# Patient Record
Sex: FEMALE | Race: WHITE | NOT HISPANIC OR LATINO | Employment: FULL TIME | URBAN - METROPOLITAN AREA
[De-identification: names, ages, dates, MRNs, and addresses within clinical notes are randomized per-mention and may not be internally consistent; named-entity substitution may affect disease eponyms.]

---

## 2017-10-24 ENCOUNTER — GENERIC CONVERSION - ENCOUNTER (OUTPATIENT)
Dept: OTHER | Facility: OTHER | Age: 52
End: 2017-10-24

## 2017-10-24 DIAGNOSIS — Z12.31 ENCOUNTER FOR SCREENING MAMMOGRAM FOR MALIGNANT NEOPLASM OF BREAST: ICD-10-CM

## 2018-01-22 VITALS
SYSTOLIC BLOOD PRESSURE: 116 MMHG | OXYGEN SATURATION: 96 % | HEART RATE: 85 BPM | WEIGHT: 137 LBS | DIASTOLIC BLOOD PRESSURE: 70 MMHG | BODY MASS INDEX: 22.82 KG/M2 | HEIGHT: 65 IN | TEMPERATURE: 98.9 F

## 2018-01-22 VITALS — HEART RATE: 85 BPM | OXYGEN SATURATION: 96 %

## 2018-07-09 ENCOUNTER — OFFICE VISIT (OUTPATIENT)
Dept: FAMILY MEDICINE CLINIC | Facility: CLINIC | Age: 53
End: 2018-07-09
Payer: COMMERCIAL

## 2018-07-09 VITALS
DIASTOLIC BLOOD PRESSURE: 90 MMHG | HEART RATE: 81 BPM | OXYGEN SATURATION: 98 % | BODY MASS INDEX: 23.32 KG/M2 | TEMPERATURE: 97.8 F | WEIGHT: 140 LBS | HEIGHT: 65 IN | SYSTOLIC BLOOD PRESSURE: 124 MMHG

## 2018-07-09 DIAGNOSIS — E01.0 THYROMEGALY: Primary | ICD-10-CM

## 2018-07-09 DIAGNOSIS — Z13.220 SCREENING, LIPID: ICD-10-CM

## 2018-07-09 DIAGNOSIS — Z11.59 ENCOUNTER FOR HEPATITIS C SCREENING TEST FOR LOW RISK PATIENT: ICD-10-CM

## 2018-07-09 DIAGNOSIS — R63.5 WEIGHT GAIN: ICD-10-CM

## 2018-07-09 DIAGNOSIS — R22.1 NECK FULLNESS: ICD-10-CM

## 2018-07-09 PROCEDURE — 3008F BODY MASS INDEX DOCD: CPT | Performed by: FAMILY MEDICINE

## 2018-07-09 PROCEDURE — 99213 OFFICE O/P EST LOW 20 MIN: CPT | Performed by: FAMILY MEDICINE

## 2018-07-09 RX ORDER — PROMETHAZINE HYDROCHLORIDE AND CODEINE PHOSPHATE 6.25; 1 MG/5ML; MG/5ML
5 SYRUP ORAL EVERY 4 HOURS PRN
COMMUNITY
Start: 2017-10-24 | End: 2019-05-08 | Stop reason: CLARIF

## 2018-07-09 RX ORDER — BENZONATATE 200 MG/1
1 CAPSULE ORAL 3 TIMES DAILY PRN
COMMUNITY
Start: 2017-10-24 | End: 2019-05-08 | Stop reason: CLARIF

## 2018-07-09 NOTE — PROGRESS NOTES
Assessment/Plan:    No problem-specific Assessment & Plan notes found for this encounter  Diagnoses and all orders for this visit:    I am going to go ahead by starting with an ultrasound of the thyroid as well as some basic lab work  If everything is normal in her symptoms persist I may refer her to ENT for further evaluation of the swallowing/neck fullness problem  Thyromegaly  Neck fullness  -     US thyroid; Future  -     TSH, 3rd generation with Free T4 reflex; Future    Weight gain  -     CBC and differential; Future  -     Comprehensive metabolic panel; Future  -     TSH, 3rd generation with Free T4 reflex; Future    Screening, lipid  -     Lipid Panel with Direct LDL reflex; Future    Encounter for hepatitis C screening test for low risk patient  -     Hepatitis C antibody; Future                Subjective:   Chief Complaint   Patient presents with    thyroid     pt states over the pass few months she has noticed wt gain, ear pain, pt also states her her throat feel swollen but no pain, pt would like to rule out thyroid problems         Patient ID: Calista Severs is a 47 y o  female      The pt is here because she has had some weird sx  Feels like there is a vice around her neck that causes some discomfort with swallowing  Has had some weight gain  Feels like maybe her thyroid is swollen  Everyone in the family has thyroid issues - mom on synthroid for years, mat aunt had thyroidectomy, mat GM had a slow growing thyroid cancer  No more fatigue than usual  Has had some ear discomfort, sometimes the right sometimes the left  No cp/sob  No d/c  No dysuria/freq          The following portions of the patient's history were reviewed and updated as appropriate: allergies, current medications, past family history, past medical history, past social history, past surgical history and problem list     Review of Systems      Objective:  Vitals:    07/09/18 1432   BP: 124/90   Pulse: 81   Temp: 97 8 °F (36 6 °C) SpO2: 98%   Weight: 63 5 kg (140 lb)   Height: 5' 5" (1 651 m)      Physical Exam   Constitutional: She is oriented to person, place, and time  She appears well-developed and well-nourished  HENT:   Head: Normocephalic and atraumatic  Right Ear: Tympanic membrane, external ear and ear canal normal    Left Ear: Tympanic membrane, external ear and ear canal normal    Nose: Nose normal    Mouth/Throat: Oropharynx is clear and moist    Neck: Normal range of motion  Neck supple  Thyromegaly present  Pulmonary/Chest: Effort normal  No respiratory distress  Musculoskeletal: Normal range of motion  Lymphadenopathy:     She has no cervical adenopathy  Neurological: She is alert and oriented to person, place, and time  Skin: Skin is warm and dry  Psychiatric: She has a normal mood and affect

## 2018-07-09 NOTE — PATIENT INSTRUCTIONS
Hypothyroidism   WHAT YOU NEED TO KNOW:   Hypothyroidism is a condition that develops when the thyroid gland does not make enough thyroid hormone  Thyroid hormones help control body temperature, heart rate, growth, and weight  DISCHARGE INSTRUCTIONS:   Call 911 for any of the following:   · You have sudden chest pain or shortness of breath  · You have a seizure  · You feel like you are going to faint  Return to the emergency department if:   · You have diarrhea, tremors, or trouble sleeping  · Your legs, ankles, or feet are swollen  Contact your healthcare provider if:   · You have a fever  · You have chills, a cough, or feel weak and achy  · You have pain and swelling in your muscles and joints  · Your skin is itchy, swollen, or you have a rash  · Your signs and symptoms return or get worse, even after treatment  · You have questions or concerns about your condition or care  Medicines:   · Thyroid hormone replacement medicine  helps bring your thyroid hormone level back to normal     · Take your medicine as directed  Contact your healthcare provider if you think your medicine is not helping or if you have side effects  Tell him or her if you are allergic to any medicine  Keep a list of the medicines, vitamins, and herbs you take  Include the amounts, and when and why you take them  Bring the list or the pill bottles to follow-up visits  Carry your medicine list with you in case of an emergency  Follow up with your healthcare provider as directed: You may need to return for more blood tests to check your thyroid hormone level  This will show if you are getting the right amount of thyroid medicine  Write down your questions so you remember to ask them during your visits  © 2017 [employer] Information is for End User's use only and may not be sold, redistributed or otherwise used for commercial purposes   All illustrations and images included in CareNotes® are the copyrighted property of [employer]  or [employer]  The above information is an  only  It is not intended as medical advice for individual conditions or treatments  Talk to your doctor, nurse or pharmacist before following any medical regimen to see if it is safe and effective for you

## 2018-07-14 LAB
ALBUMIN SERPL-MCNC: 4.6 G/DL (ref 3.5–5.5)
ALBUMIN/GLOB SERPL: 1.8 {RATIO} (ref 1.2–2.2)
ALP SERPL-CCNC: 58 IU/L (ref 39–117)
ALT SERPL-CCNC: 10 IU/L (ref 0–32)
AST SERPL-CCNC: 16 IU/L (ref 0–40)
BASOPHILS # BLD AUTO: 0 X10E3/UL (ref 0–0.2)
BASOPHILS NFR BLD AUTO: 1 %
BILIRUB SERPL-MCNC: 0.3 MG/DL (ref 0–1.2)
BUN SERPL-MCNC: 9 MG/DL (ref 6–24)
BUN/CREAT SERPL: 11 (ref 9–23)
CALCIUM SERPL-MCNC: 9.4 MG/DL (ref 8.7–10.2)
CHLORIDE SERPL-SCNC: 98 MMOL/L (ref 96–106)
CHOLEST SERPL-MCNC: 160 MG/DL (ref 100–199)
CO2 SERPL-SCNC: 22 MMOL/L (ref 20–29)
CREAT SERPL-MCNC: 0.81 MG/DL (ref 0.57–1)
EOSINOPHIL # BLD AUTO: 0.3 X10E3/UL (ref 0–0.4)
EOSINOPHIL NFR BLD AUTO: 4 %
ERYTHROCYTE [DISTWIDTH] IN BLOOD BY AUTOMATED COUNT: 13.2 % (ref 12.3–15.4)
GLOBULIN SER-MCNC: 2.6 G/DL (ref 1.5–4.5)
GLUCOSE SERPL-MCNC: 92 MG/DL (ref 65–99)
HCT VFR BLD AUTO: 39.3 % (ref 34–46.6)
HCV AB S/CO SERPL IA: 0.1 S/CO RATIO (ref 0–0.9)
HDLC SERPL-MCNC: 57 MG/DL
HGB BLD-MCNC: 13 G/DL (ref 11.1–15.9)
IMM GRANULOCYTES # BLD: 0 X10E3/UL (ref 0–0.1)
IMM GRANULOCYTES NFR BLD: 0 %
LDLC SERPL CALC-MCNC: 94 MG/DL (ref 0–99)
LDLC/HDLC SERPL: 1.6 RATIO (ref 0–3.2)
LYMPHOCYTES # BLD AUTO: 1.4 X10E3/UL (ref 0.7–3.1)
LYMPHOCYTES NFR BLD AUTO: 23 %
MCH RBC QN AUTO: 30.7 PG (ref 26.6–33)
MCHC RBC AUTO-ENTMCNC: 33.1 G/DL (ref 31.5–35.7)
MCV RBC AUTO: 93 FL (ref 79–97)
MONOCYTES # BLD AUTO: 0.6 X10E3/UL (ref 0.1–0.9)
MONOCYTES NFR BLD AUTO: 10 %
NEUTROPHILS # BLD AUTO: 3.6 X10E3/UL (ref 1.4–7)
NEUTROPHILS NFR BLD AUTO: 62 %
PLATELET # BLD AUTO: 272 X10E3/UL (ref 150–379)
POTASSIUM SERPL-SCNC: 3.9 MMOL/L (ref 3.5–5.2)
PROT SERPL-MCNC: 7.2 G/DL (ref 6–8.5)
RBC # BLD AUTO: 4.24 X10E6/UL (ref 3.77–5.28)
SL AMB EGFR AFRICAN AMERICAN: 97 ML/MIN/1.73
SL AMB EGFR NON AFRICAN AMERICAN: 84 ML/MIN/1.73
SL AMB VLDL CHOLESTEROL CALC: 9 MG/DL (ref 5–40)
SODIUM SERPL-SCNC: 138 MMOL/L (ref 134–144)
TRIGL SERPL-MCNC: 47 MG/DL (ref 0–149)
TSH SERPL DL<=0.005 MIU/L-ACNC: 3.08 UIU/ML (ref 0.45–4.5)
WBC # BLD AUTO: 5.9 X10E3/UL (ref 3.4–10.8)

## 2018-11-10 ENCOUNTER — HOSPITAL ENCOUNTER (OUTPATIENT)
Dept: ULTRASOUND IMAGING | Facility: HOSPITAL | Age: 53
Discharge: HOME | End: 2018-11-10
Attending: FAMILY MEDICINE
Payer: COMMERCIAL

## 2018-11-10 DIAGNOSIS — E01.0 THYROMEGALY: ICD-10-CM

## 2018-11-10 PROCEDURE — 76536 US EXAM OF HEAD AND NECK: CPT

## 2019-04-12 ENCOUNTER — TELEPHONE (OUTPATIENT)
Dept: FAMILY MEDICINE CLINIC | Facility: CLINIC | Age: 54
End: 2019-04-12

## 2019-05-08 ENCOUNTER — OFFICE VISIT (OUTPATIENT)
Dept: FAMILY MEDICINE CLINIC | Facility: CLINIC | Age: 54
End: 2019-05-08
Payer: COMMERCIAL

## 2019-05-08 VITALS
DIASTOLIC BLOOD PRESSURE: 84 MMHG | BODY MASS INDEX: 22.82 KG/M2 | HEART RATE: 77 BPM | TEMPERATURE: 98.3 F | SYSTOLIC BLOOD PRESSURE: 142 MMHG | OXYGEN SATURATION: 99 % | HEIGHT: 65 IN | WEIGHT: 137 LBS

## 2019-05-08 DIAGNOSIS — Z12.11 SCREEN FOR COLON CANCER: ICD-10-CM

## 2019-05-08 DIAGNOSIS — Z00.00 ROUTINE ADULT HEALTH MAINTENANCE: Primary | ICD-10-CM

## 2019-05-08 DIAGNOSIS — Z12.39 SCREENING FOR BREAST CANCER: ICD-10-CM

## 2019-05-08 DIAGNOSIS — N93.9 ABNORMAL UTERINE BLEEDING: ICD-10-CM

## 2019-05-08 PROCEDURE — 99396 PREV VISIT EST AGE 40-64: CPT | Performed by: FAMILY MEDICINE

## 2019-05-09 ENCOUNTER — CLINICAL SUPPORT (OUTPATIENT)
Dept: FAMILY MEDICINE CLINIC | Facility: CLINIC | Age: 54
End: 2019-05-09
Payer: COMMERCIAL

## 2019-05-09 DIAGNOSIS — N93.9 ABNORMAL UTERINE BLEEDING: Primary | ICD-10-CM

## 2019-05-09 PROCEDURE — 36415 COLL VENOUS BLD VENIPUNCTURE: CPT

## 2019-05-10 LAB
BASOPHILS # BLD AUTO: 0 X10E3/UL (ref 0–0.2)
BASOPHILS NFR BLD AUTO: 1 %
EOSINOPHIL # BLD AUTO: 0.2 X10E3/UL (ref 0–0.4)
EOSINOPHIL NFR BLD AUTO: 4 %
ERYTHROCYTE [DISTWIDTH] IN BLOOD BY AUTOMATED COUNT: 13.4 % (ref 12.3–15.4)
FSH SERPL-ACNC: 14 MIU/ML
HCT VFR BLD AUTO: 39.5 % (ref 34–46.6)
HGB BLD-MCNC: 13.4 G/DL (ref 11.1–15.9)
IMM GRANULOCYTES # BLD: 0 X10E3/UL (ref 0–0.1)
IMM GRANULOCYTES NFR BLD: 0 %
LH SERPL-ACNC: 8.1 MIU/ML
LYMPHOCYTES # BLD AUTO: 1.3 X10E3/UL (ref 0.7–3.1)
LYMPHOCYTES NFR BLD AUTO: 28 %
MCH RBC QN AUTO: 30.5 PG (ref 26.6–33)
MCHC RBC AUTO-ENTMCNC: 33.9 G/DL (ref 31.5–35.7)
MCV RBC AUTO: 90 FL (ref 79–97)
MONOCYTES # BLD AUTO: 0.4 X10E3/UL (ref 0.1–0.9)
MONOCYTES NFR BLD AUTO: 8 %
NEUTROPHILS # BLD AUTO: 2.8 X10E3/UL (ref 1.4–7)
NEUTROPHILS NFR BLD AUTO: 59 %
PLATELET # BLD AUTO: 322 X10E3/UL (ref 150–379)
RBC # BLD AUTO: 4.39 X10E6/UL (ref 3.77–5.28)
T4 FREE SERPL-MCNC: 1.15 NG/DL (ref 0.82–1.77)
TSH SERPL DL<=0.005 MIU/L-ACNC: 4.1 UIU/ML (ref 0.45–4.5)
WBC # BLD AUTO: 4.6 X10E3/UL (ref 3.4–10.8)

## 2019-06-20 ENCOUNTER — ANNUAL EXAM (OUTPATIENT)
Dept: FAMILY MEDICINE CLINIC | Facility: CLINIC | Age: 54
End: 2019-06-20
Payer: COMMERCIAL

## 2019-06-20 VITALS
BODY MASS INDEX: 22.74 KG/M2 | DIASTOLIC BLOOD PRESSURE: 78 MMHG | WEIGHT: 136.5 LBS | OXYGEN SATURATION: 90 % | SYSTOLIC BLOOD PRESSURE: 130 MMHG | HEART RATE: 86 BPM | TEMPERATURE: 98.3 F | HEIGHT: 65 IN

## 2019-06-20 DIAGNOSIS — Z12.4 SCREENING FOR CERVICAL CANCER: ICD-10-CM

## 2019-06-20 DIAGNOSIS — Z01.419 ENCOUNTER FOR GYNECOLOGICAL EXAMINATION WITHOUT ABNORMAL FINDING: Primary | ICD-10-CM

## 2019-06-20 PROCEDURE — S0612 ANNUAL GYNECOLOGICAL EXAMINA: HCPCS | Performed by: FAMILY MEDICINE

## 2019-06-24 LAB
CYTOLOGIST CVX/VAG CYTO: NORMAL
DX ICD CODE: NORMAL
HPV I/H RISK 1 DNA CVX QL PROBE+SIG AMP: NEGATIVE
OTHER STN SPEC: NORMAL
PATH REPORT.FINAL DX SPEC: NORMAL
SL AMB NOTE:: NORMAL
SL AMB SPECIMEN ADEQUACY: NORMAL

## 2019-07-08 ENCOUNTER — TELEPHONE (OUTPATIENT)
Dept: FAMILY MEDICINE CLINIC | Facility: CLINIC | Age: 54
End: 2019-07-08

## 2019-07-08 NOTE — TELEPHONE ENCOUNTER
LMOM TO CALL    Please advise patient that we have not received results of her Cologuard test yet  Has she completed this?

## 2019-08-01 ENCOUNTER — OFFICE VISIT (OUTPATIENT)
Dept: FAMILY MEDICINE CLINIC | Facility: CLINIC | Age: 54
End: 2019-08-01
Payer: COMMERCIAL

## 2019-08-01 VITALS
HEIGHT: 65 IN | DIASTOLIC BLOOD PRESSURE: 76 MMHG | BODY MASS INDEX: 22.95 KG/M2 | SYSTOLIC BLOOD PRESSURE: 128 MMHG | HEART RATE: 76 BPM | TEMPERATURE: 98.6 F | WEIGHT: 137.75 LBS | OXYGEN SATURATION: 99 %

## 2019-08-01 DIAGNOSIS — M77.8 LEFT WRIST TENDINITIS: Primary | ICD-10-CM

## 2019-08-01 PROCEDURE — 99213 OFFICE O/P EST LOW 20 MIN: CPT | Performed by: FAMILY MEDICINE

## 2019-08-01 PROCEDURE — 1036F TOBACCO NON-USER: CPT | Performed by: FAMILY MEDICINE

## 2019-08-01 PROCEDURE — 3008F BODY MASS INDEX DOCD: CPT | Performed by: FAMILY MEDICINE

## 2019-08-01 NOTE — PATIENT INSTRUCTIONS
Tendinitis   WHAT YOU NEED TO KNOW:   Tendinitis is painful inflammation or breakdown of your tendons  It may also be called tendinopathy  Tendinitis often occurs in the knee, shoulder, ankle, hip, or elbow  DISCHARGE INSTRUCTIONS:   Medicines:   · Pain medicines  such as acetaminophen or NSAIDs may decrease swelling and pain or fever  These medicines are available without a doctor's order  Ask which medicine to take  Ask how much to take and when to take it  Follow directions  Acetaminophen and NSAIDs can cause liver or kidney damage if not taken correctly  If you take blood thinner medicine, always ask your healthcare provider if NSAIDs are safe for you  Always read the medicine label and follow the directions on it before using these medicine  · Take your medicine as directed  Contact your healthcare provider if you think your medicine is not helping or if you have side effects  Tell him if you are allergic to any medicine  Keep a list of the medicines, vitamins, and herbs you take  Include the amounts, and when and why you take them  Bring the list or the pill bottles to follow-up visits  Carry your medicine list with you in case of an emergency  Management:   · Rest  your tendon as directed to help it heal  Ask your healthcare provider if you need to stop putting weight on your affected area  · Ice  helps decrease swelling and pain  Ice may also help prevent tissue damage  Use an ice pack, or put crushed ice in a plastic bag  Cover it with a towel and place it on the affected area for 10 to 15 minutes every hour or as directed  · Support devices  such as a cane, splint, shoe insert, or brace may help reduce your pain  · Physical therapy  may be ordered by your healthcare provider  This may be used to teach you exercises to help improve movement and strength, and to decrease pain  You may also learn how to improve your posture, and how to lift or exercise correctly    Prevention:   · Stretch and warm up  before you exercise  · Exercise regularly  to strengthen the muscles around your joint  Ease into an exercise routine for the first 3 weeks to prevent another injury  Ask your healthcare provider about the best exercise plan for you  Rest fully between activities  · Use the right equipment  for sports and exercise  Wear braces or tape around weak joints as directed  Follow up with your healthcare provider as directed:  Write down your questions so you remember to ask them during your visits  Contact your healthcare provider if:   · You have increased pain even after you take medicine  · You have questions or concerns about your condition or care  Return to the emergency department if:   · You have increased redness over the joint, or swelling in the joint  · You suddenly cannot move your joint  © 2017 [employer]  Information is for End User's use only and may not be sold, redistributed or otherwise used for commercial purposes  All illustrations and images included in CareNotes® are the copyrighted property of [employer]  or [employer]  The above information is an  only  It is not intended as medical advice for individual conditions or treatments  Talk to your doctor, nurse or pharmacist before following any medical regimen to see if it is safe and effective for you

## 2019-08-01 NOTE — PROGRESS NOTES
Subjective:   Chief Complaint   Patient presents with    Wrist Injury     pt went to urgent care in [facility] 2 weeks ago  They gave her a brace for it and she has been using it the way she was instructed  She has been using it less  She injured her wrist on 7/19 due to her dog pulling her  She fell back and used her left wrist to try and catch herself  Pt still has Cologuard and mammo order  Patient ID: Fabien Donovan is a 47 y o  female  The pt is here today with left wrist pain  She was walking the dog and the dog took off, she slid and fell back onto the hand  Held onto the dog osorio while this happened as well  Iced it  The next morning it was really sore  Decided to go to urgent care  They did not do an xray  They did give her a splint  She can move it  Sometimes it feels fine and sometimes it is more sore or painful than other times  It feels weak  She still does have some swelling  Pain was over the wrist, now also into the hand      The following portions of the patient's history were reviewed and updated as appropriate: allergies, current medications, past family history, past medical history, past social history, past surgical history and problem list     Review of Systems      Objective:  Vitals:    08/01/19 1144   BP: 128/76   Pulse: 76   Temp: 98 6 °F (37 °C)   SpO2: 99%   Weight: 62 5 kg (137 lb 12 oz)   Height: 5' 5" (1 651 m)      Physical Exam   Constitutional: She is oriented to person, place, and time  She appears well-developed and well-nourished  No distress  Musculoskeletal:        Left wrist: She exhibits tenderness  She exhibits normal range of motion, no bony tenderness, no swelling, no effusion, no deformity and no laceration  Neurological: She is alert and oriented to person, place, and time  Skin: Skin is warm and dry  No rash noted  She is not diaphoretic  Psychiatric: She has a normal mood and affect           Assessment/Plan:    No problem-specific Assessment & Plan notes found for this encounter  Diagnoses and all orders for this visit:    Left wrist tendinitis        I think the patient has a tendinitis from the fall  I gave her home exercises to do and advised ice and ibuprofen for relief of the inflammation  She will continue to wear her splint, I advised especially during the day when she is doing things with her hands  If she is not getting better within the next 1-2 weeks I would refer her to either physical therapy or Orthopedics

## 2021-03-08 ENCOUNTER — TELEPHONE (OUTPATIENT)
Dept: FAMILY MEDICINE CLINIC | Facility: CLINIC | Age: 56
End: 2021-03-08

## 2021-04-05 DIAGNOSIS — Z23 ENCOUNTER FOR IMMUNIZATION: ICD-10-CM

## 2021-05-26 ENCOUNTER — VBI (OUTPATIENT)
Dept: ADMINISTRATIVE | Facility: OTHER | Age: 56
End: 2021-05-26

## 2023-08-28 ENCOUNTER — OFFICE VISIT (OUTPATIENT)
Dept: FAMILY MEDICINE CLINIC | Facility: CLINIC | Age: 58
End: 2023-08-28
Payer: COMMERCIAL

## 2023-08-28 VITALS
SYSTOLIC BLOOD PRESSURE: 120 MMHG | DIASTOLIC BLOOD PRESSURE: 90 MMHG | HEART RATE: 83 BPM | WEIGHT: 140 LBS | TEMPERATURE: 97.2 F | OXYGEN SATURATION: 99 % | BODY MASS INDEX: 23.32 KG/M2 | HEIGHT: 65 IN

## 2023-08-28 DIAGNOSIS — Z12.11 SCREENING FOR COLON CANCER: ICD-10-CM

## 2023-08-28 DIAGNOSIS — Z13.220 SCREENING CHOLESTEROL LEVEL: ICD-10-CM

## 2023-08-28 DIAGNOSIS — B02.9 HERPES ZOSTER WITHOUT COMPLICATION: ICD-10-CM

## 2023-08-28 DIAGNOSIS — Z00.00 ANNUAL PHYSICAL EXAM: Primary | ICD-10-CM

## 2023-08-28 DIAGNOSIS — Z13.29 SCREENING FOR THYROID DISORDER: ICD-10-CM

## 2023-08-28 DIAGNOSIS — Z13.1 SCREENING FOR DIABETES MELLITUS: ICD-10-CM

## 2023-08-28 DIAGNOSIS — Z12.31 ENCOUNTER FOR SCREENING MAMMOGRAM FOR MALIGNANT NEOPLASM OF BREAST: ICD-10-CM

## 2023-08-28 DIAGNOSIS — Z13.0 SCREENING FOR DEFICIENCY ANEMIA: ICD-10-CM

## 2023-08-28 PROCEDURE — 99396 PREV VISIT EST AGE 40-64: CPT | Performed by: NURSE PRACTITIONER

## 2023-08-28 PROCEDURE — 99213 OFFICE O/P EST LOW 20 MIN: CPT | Performed by: NURSE PRACTITIONER

## 2023-08-28 RX ORDER — VALACYCLOVIR HYDROCHLORIDE 1 G/1
1000 TABLET, FILM COATED ORAL 3 TIMES DAILY
Qty: 21 TABLET | Refills: 0 | Status: SHIPPED | OUTPATIENT
Start: 2023-08-28 | End: 2023-09-04

## 2023-08-28 NOTE — PROGRESS NOTES
[facility]    NAME: Gamal Rainey  AGE: 65 y.o. SEX: female  : 1965     DATE: 9/10/2023     Assessment and Plan:     Problem List Items Addressed This Visit        Other    Herpes zoster without complication     Valtrex three times daily        Other Visit Diagnoses     Annual physical exam    -  Primary    Screening for thyroid disorder        Relevant Orders    TSH, 3rd generation with Free T4 reflex    Screening cholesterol level        Relevant Orders    Lipid panel    Screening for deficiency anemia        Relevant Orders    CBC and differential    Screening for diabetes mellitus        Relevant Orders    Comprehensive metabolic panel    Screening for colon cancer        Relevant Orders    Ambulatory Referral to Gastroenterology    Encounter for screening mammogram for malignant neoplasm of breast        Relevant Orders    Mammo screening bilateral w 3d & cad          Immunizations and preventive care screenings were discussed with patient today. Appropriate education was printed on patient's after visit summary. Counseling:  Dental Health: discussed importance of regular tooth brushing, flossing, and dental visits. Injury prevention: discussed safety/seat belts, safety helmets, smoke detectors, carbon dioxide detectors, and smoking near bedding or upholstery. · Exercise: the importance of regular exercise/physical activity was discussed. Recommend exercise 3-5 times per week for at least 30 minutes. Depression Screening and Follow-up Plan: Patient was screened for depression during today's encounter. They screened negative with a PHQ-2 score of 0. No follow-ups on file.      Chief Complaint:     Chief Complaint   Patient presents with   • Numbness     Right numbness on hand pain in elbow and shoulder tight, started Thursday, been taken advil     Due for physical       History of Present Illness:     Adult Annual Physical   Patient here for a comprehensive physical exam. The patient reports problems - right arm pain. Thursday noticed her right elbow was bothering her. Has been moving banker boxes. She has a lot of paperwork and a lot of writing last week than normal, then to unwind from work works in her garden. Which was flaring her symptoms. Pain radiates from elbow down into the right pink and ring finger along medial aspect of elbow. Has been taking Advil didn't help in beginning but now helping slightly more. Tried ice which made it worse. Rash developed this morning on right wrist. Noticed rash on right neck. Diet and Physical Activity  · Diet/Nutrition: well balanced diet. · Exercise: 3-4 times a week on average. Depression Screening  PHQ-2/9 Depression Screening    Little interest or pleasure in doing things: 0 - not at all  Feeling down, depressed, or hopeless: 0 - not at all  PHQ-2 Score: 0  PHQ-2 Interpretation: Negative depression screen       General Health  · Sleep: sleeps well. · Hearing: normal - bilateral.  · Vision: goes for regular eye exams. · Dental: regular dental visits. /GYN Health  · Patient is: perimenopausal  ·      Review of Systems:     Review of Systems   Constitutional: Negative for chills and fever. HENT: Negative for ear pain and sore throat. Eyes: Negative for pain and visual disturbance. Respiratory: Negative for cough and shortness of breath. Cardiovascular: Negative for chest pain and palpitations. Gastrointestinal: Negative for abdominal pain and vomiting. Genitourinary: Positive for menstrual problem. Negative for dysuria and hematuria. Musculoskeletal: Positive for arthralgias and myalgias. Negative for back pain. Skin: Positive for rash. Negative for color change. Neurological: Negative for seizures and syncope. Hematological: Negative. Psychiatric/Behavioral: Negative. All other systems reviewed and are negative.      Past Medical History:     Past Medical History:   Diagnosis Date   • Closed left arm fracture     Last assessed 10/24/2017       Past Surgical History:     Past Surgical History:   Procedure Laterality Date   • LYMPHADENECTOMY      Last assessed 10/24/2017    • TONSILLECTOMY AND ADENOIDECTOMY      Last assessed 10/24/2017       Social History:     Social History     Socioeconomic History   • Marital status: /Civil Union     Spouse name: None   • Number of children: None   • Years of education: None   • Highest education level: None   Occupational History   • None   Tobacco Use   • Smoking status: Never   • Smokeless tobacco: Never   Substance and Sexual Activity   • Alcohol use: Yes     Comment: 1-4 drinks/week - As per [employer]    • Drug use: Never   • Sexual activity: None   Other Topics Concern   • None   Social History Narrative    Daily caffeine consumption, 2-3 servings a day    Exercises daily      Social Determinants of Health     Financial Resource Strain: Not on file   Food Insecurity: Not on file   Transportation Needs: Not on file   Physical Activity: Not on file   Stress: Not on file   Social Connections: Not on file   Intimate Partner Violence: Not on file   Housing Stability: Not on file      Family History:     Family History   Problem Relation Age of Onset   • Arthritis Mother    • Hypertension Mother    • Thyroid disease Mother    • Multiple myeloma Father    • Thyroid cancer Maternal Grandmother    • Alcohol abuse Maternal Grandfather       Current Medications:     Current Outpatient Medications   Medication Sig Dispense Refill   • valACYclovir (VALTREX) 1,000 mg tablet Take 1 tablet (1,000 mg total) by mouth 3 (three) times a day for 7 days 21 tablet 0   • gabapentin (Neurontin) 100 mg capsule Take 1 capsule (100 mg total) by mouth daily at bedtime 30 capsule 0     No current facility-administered medications for this visit.       Allergies:     No Known Allergies   Physical Exam:     BP 120/90   Pulse 83   Temp (!) 97.2 °F (36.2 °C) (Tympanic)   Ht 5' 5" (1.651 m)   Wt 63.5 kg (140 lb)   SpO2 99%   BMI 23.30 kg/m²     Physical Exam  Vitals and nursing note reviewed. Constitutional:       General: She is not in acute distress. Appearance: She is well-developed and normal weight. HENT:      Head: Normocephalic and atraumatic. Right Ear: Tympanic membrane, ear canal and external ear normal.      Left Ear: Tympanic membrane, ear canal and external ear normal.      Nose: Nose normal.      Mouth/Throat:      Mouth: Mucous membranes are moist.      Pharynx: Oropharynx is clear. Eyes:      Conjunctiva/sclera: Conjunctivae normal.      Pupils: Pupils are equal, round, and reactive to light. Neck:      Thyroid: No thyromegaly or thyroid tenderness. Cardiovascular:      Rate and Rhythm: Normal rate and regular rhythm. Pulses:           Radial pulses are 2+ on the right side and 2+ on the left side. Heart sounds: Normal heart sounds. No murmur heard. Pulmonary:      Effort: Pulmonary effort is normal. No respiratory distress. Breath sounds: Normal breath sounds. Abdominal:      General: Bowel sounds are normal.      Palpations: Abdomen is soft. Tenderness: There is no abdominal tenderness. Musculoskeletal:         General: Tenderness (tenderness along dermatonal pattern of vesicualr rash) present. Cervical back: Neck supple. Right lower leg: No edema. Left lower leg: No edema. Lymphadenopathy:      Cervical: No cervical adenopathy. Skin:     General: Skin is warm and dry. Capillary Refill: Capillary refill takes less than 2 seconds. Findings: Rash present. Comments: Vesicular rash on right upper extremity and right lateral neck   Neurological:      Mental Status: She is alert and oriented to person, place, and time.    Psychiatric:         Mood and Affect: Mood normal.         Behavior: Behavior normal.         Thought Content: Thought content normal.          Bayron Hester, [facility]

## 2023-08-30 ENCOUNTER — TELEPHONE (OUTPATIENT)
Dept: FAMILY MEDICINE CLINIC | Facility: CLINIC | Age: 58
End: 2023-08-30

## 2023-08-30 DIAGNOSIS — B02.9 HERPES ZOSTER WITHOUT COMPLICATION: Primary | ICD-10-CM

## 2023-08-30 RX ORDER — GABAPENTIN 100 MG/1
100 CAPSULE ORAL
Qty: 30 CAPSULE | Refills: 0 | Status: SHIPPED | OUTPATIENT
Start: 2023-08-30

## 2023-08-30 NOTE — TELEPHONE ENCOUNTER
Pt left a message on vm out lock please advised       Hi this is Jeane Franco. I was in on Monday and I was seen for shingles. The antiviral medicine, thank heavens is starting to kick in. But my problem is I have to get back to work and I cannot sleep throughout the night. It's coming up on a week, so I was wondering if there was anything like Tylenol is not doing it, if there's anything that she can recommend or call in or something? Because I usually last three hours maximum laying on my back and then that's about it, and then I'm waiting for the rest of them. So just need a little bit to function, let this medicine keep going. Like I said [employer] . My phone number's [phone] and my date of birth is [birth year] Thank you.

## 2023-09-10 PROBLEM — B02.9 HERPES ZOSTER WITHOUT COMPLICATION: Status: ACTIVE | Noted: 2023-09-10

## 2023-10-27 ENCOUNTER — TELEPHONE (OUTPATIENT)
Dept: FAMILY MEDICINE CLINIC | Facility: CLINIC | Age: 58
End: 2023-10-27

## 2023-10-27 NOTE — TELEPHONE ENCOUNTER
Phone call made for patient to cancelled Monday appointment for physical. Physical already completed on 08/2023.     FYI  Patient just tested positive covid , sinus and congestion. Patient advise keep hydrated and breathing  exercises. ER if shortness of breath. if any question feel free to contact office.

## 2023-11-02 ENCOUNTER — CLINICAL SUPPORT (OUTPATIENT)
Dept: FAMILY MEDICINE CLINIC | Facility: CLINIC | Age: 58
End: 2023-11-02
Payer: COMMERCIAL

## 2023-11-02 DIAGNOSIS — Z13.0 SCREENING FOR DEFICIENCY ANEMIA: ICD-10-CM

## 2023-11-02 DIAGNOSIS — Z13.1 SCREENING FOR DIABETES MELLITUS: ICD-10-CM

## 2023-11-02 DIAGNOSIS — Z13.220 SCREENING CHOLESTEROL LEVEL: Primary | ICD-10-CM

## 2023-11-02 DIAGNOSIS — Z13.29 SCREENING FOR THYROID DISORDER: ICD-10-CM

## 2023-11-02 PROCEDURE — 36415 COLL VENOUS BLD VENIPUNCTURE: CPT | Performed by: NURSE PRACTITIONER

## 2023-11-03 LAB
ALBUMIN SERPL-MCNC: 4.9 G/DL (ref 3.8–4.9)
ALBUMIN/GLOB SERPL: 1.8 {RATIO} (ref 1.2–2.2)
ALP SERPL-CCNC: 67 IU/L (ref 44–121)
ALT SERPL-CCNC: 33 IU/L (ref 0–32)
AST SERPL-CCNC: 32 IU/L (ref 0–40)
BASOPHILS # BLD AUTO: 0 X10E3/UL (ref 0–0.2)
BASOPHILS NFR BLD AUTO: 1 %
BILIRUB SERPL-MCNC: 0.8 MG/DL (ref 0–1.2)
BUN SERPL-MCNC: 12 MG/DL (ref 6–24)
BUN/CREAT SERPL: 18 (ref 9–23)
CALCIUM SERPL-MCNC: 10.2 MG/DL (ref 8.7–10.2)
CHLORIDE SERPL-SCNC: 100 MMOL/L (ref 96–106)
CHOLEST SERPL-MCNC: 216 MG/DL (ref 100–199)
CHOLEST/HDLC SERPL: 4.2 RATIO (ref 0–4.4)
CO2 SERPL-SCNC: 22 MMOL/L (ref 20–29)
CREAT SERPL-MCNC: 0.67 MG/DL (ref 0.57–1)
EGFR: 102 ML/MIN/1.73
EOSINOPHIL # BLD AUTO: 0.1 X10E3/UL (ref 0–0.4)
EOSINOPHIL NFR BLD AUTO: 2 %
ERYTHROCYTE [DISTWIDTH] IN BLOOD BY AUTOMATED COUNT: 12 % (ref 11.7–15.4)
GLOBULIN SER-MCNC: 2.7 G/DL (ref 1.5–4.5)
GLUCOSE SERPL-MCNC: 91 MG/DL (ref 70–99)
HCT VFR BLD AUTO: 42.3 % (ref 34–46.6)
HDLC SERPL-MCNC: 52 MG/DL
HGB BLD-MCNC: 14.2 G/DL (ref 11.1–15.9)
IMM GRANULOCYTES # BLD: 0 X10E3/UL (ref 0–0.1)
IMM GRANULOCYTES NFR BLD: 1 %
LDLC SERPL CALC-MCNC: 146 MG/DL (ref 0–99)
LYMPHOCYTES # BLD AUTO: 1.5 X10E3/UL (ref 0.7–3.1)
LYMPHOCYTES NFR BLD AUTO: 30 %
MCH RBC QN AUTO: 31.8 PG (ref 26.6–33)
MCHC RBC AUTO-ENTMCNC: 33.6 G/DL (ref 31.5–35.7)
MCV RBC AUTO: 95 FL (ref 79–97)
MONOCYTES # BLD AUTO: 0.3 X10E3/UL (ref 0.1–0.9)
MONOCYTES NFR BLD AUTO: 6 %
NEUTROPHILS # BLD AUTO: 3.1 X10E3/UL (ref 1.4–7)
NEUTROPHILS NFR BLD AUTO: 60 %
PLATELET # BLD AUTO: 346 X10E3/UL (ref 150–450)
POTASSIUM SERPL-SCNC: 4.6 MMOL/L (ref 3.5–5.2)
PROT SERPL-MCNC: 7.6 G/DL (ref 6–8.5)
RBC # BLD AUTO: 4.47 X10E6/UL (ref 3.77–5.28)
SL AMB VLDL CHOLESTEROL CALC: 18 MG/DL (ref 5–40)
SODIUM SERPL-SCNC: 140 MMOL/L (ref 134–144)
TRIGL SERPL-MCNC: 99 MG/DL (ref 0–149)
TSH SERPL DL<=0.005 MIU/L-ACNC: 0.68 UIU/ML (ref 0.45–4.5)
WBC # BLD AUTO: 5.1 X10E3/UL (ref 3.4–10.8)

## 2023-11-06 ENCOUNTER — TELEPHONE (OUTPATIENT)
Dept: FAMILY MEDICINE CLINIC | Facility: CLINIC | Age: 58
End: 2023-11-06

## 2023-11-06 NOTE — RESULT ENCOUNTER NOTE
Simone June, Your labs show mildly elevated cholesterol compared to last time we checked in 2018. Otherwise labs are stable. Recommend low fat diet and continued exercise.  Repeat in 1 year

## 2023-11-06 NOTE — TELEPHONE ENCOUNTER
Patient aware on results, saw mychart message from provider to them on [address], Your labs show mildly elevated cholesterol compared to last time we checked in 2018. Otherwise labs are stable. Recommend low fat diet and continued exercise.  Repeat in 1 year   Written by Chase Duarte on 11/6/2023  8:23 AM EST  Seen by patient Kaila Ruth on 11/6/2023  8:25 AM

## 2023-11-06 NOTE — TELEPHONE ENCOUNTER
----- Message from Dameon Luevano, [facility] sent at 11/6/2023  8:23 AM EST -----  Simone June, Your labs show mildly elevated cholesterol compared to last time we checked in 2018. Otherwise labs are stable. Recommend low fat diet and continued exercise.  Repeat in 1 year

## 2023-11-29 ENCOUNTER — OFFICE VISIT (OUTPATIENT)
Dept: FAMILY MEDICINE CLINIC | Facility: CLINIC | Age: 58
End: 2023-11-29
Payer: COMMERCIAL

## 2023-11-29 VITALS
OXYGEN SATURATION: 100 % | SYSTOLIC BLOOD PRESSURE: 118 MMHG | DIASTOLIC BLOOD PRESSURE: 89 MMHG | HEART RATE: 97 BPM | HEIGHT: 65 IN | TEMPERATURE: 96.7 F | WEIGHT: 134 LBS | BODY MASS INDEX: 22.33 KG/M2

## 2023-11-29 DIAGNOSIS — K52.9 GASTROENTERITIS: Primary | ICD-10-CM

## 2023-11-29 PROCEDURE — 99213 OFFICE O/P EST LOW 20 MIN: CPT | Performed by: NURSE PRACTITIONER

## 2023-11-29 NOTE — PROGRESS NOTES
Name: Deepthi Larson      : 1965      MRN: [id number]  Encounter Provider: SUDHAKAR Watts  Encounter Date: 2023   Encounter department: [facility]    Assessment & Plan     1. Gastroenteritis  Comments:  symptoms resolved  continue to keep diet on lighter side, adequate hydration, avoid dairy, greasy foods, fatty foods        Depression Screening and Follow-up Plan: Patient was screened for depression during today's encounter. They screened negative with a PHQ-2 score of 0. Subjective      As soon as she ate Saturday night a steak had significant pain in lower abdomen took 2 tums which didn't help and then had nausea and vomiting then  night episode of diarrhea. Hasn't had a normal bowel movement since . Has not had any vomiting since Monday. Tried left over zofran but it came right back up. Had terrible migraine over the weekend. Has been drinking water, had a banana today. No blood in stool, no cough ground emesis, bile color. No fevers during that time. Review of Systems   Constitutional: Negative. HENT: Negative. Respiratory: Negative. Cardiovascular: Negative. Gastrointestinal:  Positive for abdominal pain, diarrhea, nausea and vomiting. All symptoms have resolved now     Genitourinary: Negative. Neurological: Negative. Hematological: Negative. No current outpatient medications on file prior to visit. Objective     /89   Pulse 97   Temp (!) 96.7 °F (35.9 °C) (Tympanic)   Ht 5' 5" (1.651 m)   Wt 60.8 kg (134 lb)   SpO2 100%   BMI 22.30 kg/m²     Physical Exam  Vitals reviewed. Constitutional:       General: She is not in acute distress. Appearance: She is well-developed and normal weight. HENT:      Head: Normocephalic. Mouth/Throat:      Mouth: Mucous membranes are moist.   Eyes:      General: No scleral icterus. Extraocular Movements: Extraocular movements intact. Cardiovascular:      Rate and Rhythm: Normal rate and regular rhythm. Heart sounds: Normal heart sounds. Pulmonary:      Effort: Pulmonary effort is normal.      Breath sounds: Normal breath sounds. Abdominal:      General: Abdomen is flat. Bowel sounds are normal.      Palpations: Abdomen is soft. Tenderness: There is no abdominal tenderness. There is no right CVA tenderness, left CVA tenderness or guarding. Negative signs include Montemayor's sign. Skin:     General: Skin is warm. Neurological:      Mental Status: She is alert and oriented to person, place, and time.        Randa Page

## 2023-12-13 ENCOUNTER — NEW PATIENT COMPREHENSIVE (OUTPATIENT)
Dept: URBAN - METROPOLITAN AREA CLINIC 79 | Facility: CLINIC | Age: 58
End: 2023-12-13

## 2023-12-13 DIAGNOSIS — H25.13: ICD-10-CM

## 2023-12-13 DIAGNOSIS — H52.4: ICD-10-CM

## 2023-12-13 PROCEDURE — 99204 OFFICE O/P NEW MOD 45 MIN: CPT

## 2023-12-13 PROCEDURE — 92250 FUNDUS PHOTOGRAPHY W/I&R: CPT

## 2023-12-13 PROCEDURE — 92015 DETERMINE REFRACTIVE STATE: CPT

## 2023-12-13 ASSESSMENT — TONOMETRY
OD_IOP_MMHG: 14
OS_IOP_MMHG: 14

## 2023-12-13 ASSESSMENT — VISUAL ACUITY
OD_SC: 20/30
OS_SC: 20/30
OD_CC: 20/30
OS_CC: 20/30

## 2024-01-11 ENCOUNTER — VBI (OUTPATIENT)
Dept: ADMINISTRATIVE | Facility: OTHER | Age: 59
End: 2024-01-11

## 2024-01-11 NOTE — TELEPHONE ENCOUNTER
Upon review of the In Basket request we were able to locate, review, and update the patient chart as requested for Mammogram.    Any additional questions or concerns should be emailed to the Practice Liaisons via the appropriate education email address, please do not reply via In Basket.    Thank you  Kacey Agee

## 2024-02-01 ENCOUNTER — HOSPITAL ENCOUNTER (EMERGENCY)
Dept: HOSPITAL 99 - EMR | Age: 59
Discharge: HOME | End: 2024-02-01
Payer: COMMERCIAL

## 2024-02-01 VITALS — SYSTOLIC BLOOD PRESSURE: 112 MMHG | RESPIRATION RATE: 16 BRPM | DIASTOLIC BLOOD PRESSURE: 74 MMHG

## 2024-02-01 VITALS — SYSTOLIC BLOOD PRESSURE: 150 MMHG | RESPIRATION RATE: 16 BRPM | DIASTOLIC BLOOD PRESSURE: 100 MMHG

## 2024-02-01 DIAGNOSIS — S01.01XA: ICD-10-CM

## 2024-02-01 DIAGNOSIS — H93.13: ICD-10-CM
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
## 2024-02-01 DIAGNOSIS — S09.90XA: Primary | ICD-10-CM

## 2024-02-01 DIAGNOSIS — M54.2: ICD-10-CM

## 2024-02-01 DIAGNOSIS — R51.9: ICD-10-CM

## 2024-02-01 DIAGNOSIS — M79.89: ICD-10-CM

## 2024-02-01 DIAGNOSIS — R03.0: ICD-10-CM

## 2024-02-01 DIAGNOSIS — S60.051A: ICD-10-CM
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
## 2024-02-01 PROCEDURE — 29130 APPL FINGER SPLINT STATIC: CPT

## 2024-02-01 PROCEDURE — 99284 EMERGENCY DEPT VISIT MOD MDM: CPT

## 2024-02-12 ENCOUNTER — OFFICE VISIT (OUTPATIENT)
Dept: FAMILY MEDICINE CLINIC | Facility: CLINIC | Age: 59
End: 2024-02-12
Payer: COMMERCIAL

## 2024-02-12 ENCOUNTER — PATIENT MESSAGE (OUTPATIENT)
Dept: FAMILY MEDICINE CLINIC | Facility: CLINIC | Age: 59
End: 2024-02-12

## 2024-02-12 VITALS
DIASTOLIC BLOOD PRESSURE: 86 MMHG | HEART RATE: 87 BPM | HEIGHT: 65 IN | BODY MASS INDEX: 23.82 KG/M2 | SYSTOLIC BLOOD PRESSURE: 132 MMHG | TEMPERATURE: 97.3 F | OXYGEN SATURATION: 99 % | WEIGHT: 143 LBS

## 2024-02-12 DIAGNOSIS — S09.90XA INJURY OF HEAD, INITIAL ENCOUNTER: ICD-10-CM

## 2024-02-12 DIAGNOSIS — S60.051A CONTUSION OF RIGHT LITTLE FINGER WITHOUT DAMAGE TO NAIL, INITIAL ENCOUNTER: ICD-10-CM

## 2024-02-12 DIAGNOSIS — M79.631 RIGHT FOREARM PAIN: ICD-10-CM

## 2024-02-12 DIAGNOSIS — M54.2 ACUTE NECK PAIN: ICD-10-CM

## 2024-02-12 PROCEDURE — 99214 OFFICE O/P EST MOD 30 MIN: CPT | Performed by: NURSE PRACTITIONER

## 2024-02-12 NOTE — PROGRESS NOTES
Name: Kerline Garcia      : 1965      MRN: [id number]  Encounter Provider: SUDHAKAR Adams  Encounter Date: 2024   Encounter department: [facility]    Assessment & Plan     1. Injury of head, initial encounter  Assessment & Plan:  No neuro focal deficit  Recommend brain rest  Slowly return to work as recommended allow breaks periodically during day to avoid continuous eye strain on computer      2. Contusion of right little finger without damage to nail, initial encounter  Assessment & Plan:  Continue with splint, recommend slowly start use finger, range of motion exercises      3. Right forearm pain  Assessment & Plan:  Suspect strain from dog leash pull  Can try tennis elbow band, avoid repetitive movements      4. Acute neck pain  Assessment & Plan:  Warm compresses, gentle stretching, range of motion  Proper ergonomics when sitting, watch positioning of neck             Subjective      25 fell while walking her dog on a leash, fell back and hit her head on wooden deck step, no LOC.   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    HENT:  Positive for tinnitus.    Respiratory: Negative.     Cardiovascular: Negative.    Gastrointestinal: Negative.    Musculoskeletal:  Positive for arthralgias, myalgias and neck pain.   Skin:         Bruise right pinky finger     Neurological:  Positive for headaches. Negative for weakness.   Psychiatric/Behavioral:  The patient is nervous/anxious.        Current Outpatient Medications on File Prior to Visit   Medication Sig   • Acetaminophen (TYLENOL PO) Take by mouth   • Ibuprofen (ADVIL PO) Take by mouth       Objective     /86   " Pulse 87   Temp (!) 97.3 °F (36.3 °C)   Ht 5' 5\" (1.651 m)   Wt 64.9 kg (143 lb)   SpO2 99%   BMI 23.80 kg/m²     Physical Exam  Vitals and nursing note reviewed.   Constitutional:       General: She is not in acute distress.     Appearance: Normal appearance. She is normal weight.   HENT:      Head: Normocephalic.      Right Ear: Tympanic membrane, ear canal and external ear normal.      Left Ear: Tympanic membrane, ear canal and external ear normal.      Nose: Nose normal.      Mouth/Throat:      Mouth: Mucous membranes are moist.      Pharynx: Oropharynx is clear.   Eyes:      Extraocular Movements: Extraocular movements intact.      Conjunctiva/sclera: Conjunctivae normal.      Pupils: Pupils are equal, round, and reactive to light.   Cardiovascular:      Rate and Rhythm: Normal rate and regular rhythm.      Pulses:           Radial pulses are 1+ on the right side and 1+ on the left side.      Heart sounds: Normal heart sounds. No murmur heard.  Pulmonary:      Effort: Pulmonary effort is normal.      Breath sounds: Normal breath sounds.   Abdominal:      General: Abdomen is flat. Bowel sounds are normal.      Palpations: Abdomen is soft.   Musculoskeletal:      Right forearm: Tenderness (mild lateral epicondylitis tenderness) present.      Right hand: Swelling, deformity and bony tenderness (right 5th digit, ecchymosis underside near DIP joint space, swollen) present. Decreased range of motion. Normal strength. Normal sensation. Normal capillary refill. Normal pulse.      Cervical back: Normal range of motion.      Right lower leg: No edema.      Left lower leg: No edema.   Skin:     General: Skin is warm and dry.   Neurological:      General: No focal deficit present.      Mental Status: She is alert and oriented to person, place, and time.      Cranial Nerves: Cranial nerves 2-12 are intact. No cranial nerve deficit.      Sensory: No sensory deficit.      Motor: Motor function is intact. No weakness. "      Coordination: Coordination is intact. Romberg sign negative. Coordination normal.      Gait: Gait is intact. Gait normal.      Deep Tendon Reflexes: Reflexes normal.   Psychiatric:         Mood and Affect: Mood normal.         Behavior: Behavior normal.         Thought Content: Thought content normal.         Judgment: Judgment normal.       SUDHAKAR Adams

## 2024-02-12 NOTE — LETTER
February 12, 2024     Patient: Kerline Garcia  YOB: 1965  Date of Visit: 2/12/2024        To Whom it May Concern:    Kerline Garcia is under my professional care. Kerline was seen in my office on 2/12/2024. Please excuse Kerline from work for the dates including and throughout  2/12/24 until 2/19/24. Kerline may return to work on Tuesday 2/20/24.      If you have any questions or concerns, please don't hesitate to call.         Sincerely,          SUDHAKAR Adams

## 2024-02-12 NOTE — LETTER
February 12, 2024     Patient: Kerline Garcia  YOB: 1965  Date of Visit: 2/12/2024      To Whom it May Concern:    Kerline Garcia is under my professional care. Kerline was seen in my office on 2/12/2024. Please excuse Kerline from work for this week, 2/12/24 until 2/18/24. Kerline may return to work on Monday 2/19/24 .      If you have any questions or concerns, please don't hesitate to call.         Sincerely,          SUDHAKAR Adams

## 2024-02-16 PROBLEM — S60.051A CONTUSION OF RIGHT LITTLE FINGER WITHOUT DAMAGE TO NAIL: Status: ACTIVE | Noted: 2024-02-16

## 2024-02-16 PROBLEM — M79.631 RIGHT FOREARM PAIN: Status: ACTIVE | Noted: 2024-02-16

## 2024-02-16 PROBLEM — M54.2 ACUTE NECK PAIN: Status: ACTIVE | Noted: 2024-02-16

## 2024-02-16 NOTE — ASSESSMENT & PLAN NOTE
Warm compresses, gentle stretching, range of motion  Proper ergonomics when sitting, watch positioning of neck

## 2024-02-16 NOTE — ASSESSMENT & PLAN NOTE
No neuro focal deficit  Recommend brain rest  Slowly return to work as recommended allow breaks periodically during day to avoid continuous eye strain on computer

## 2024-04-02 ENCOUNTER — OFFICE VISIT (OUTPATIENT)
Dept: FAMILY MEDICINE CLINIC | Facility: CLINIC | Age: 59
End: 2024-04-02
Payer: COMMERCIAL

## 2024-04-02 VITALS
WEIGHT: 145 LBS | DIASTOLIC BLOOD PRESSURE: 90 MMHG | BODY MASS INDEX: 24.16 KG/M2 | SYSTOLIC BLOOD PRESSURE: 142 MMHG | HEIGHT: 65 IN | TEMPERATURE: 96.7 F | OXYGEN SATURATION: 99 % | HEART RATE: 89 BPM

## 2024-04-02 DIAGNOSIS — N95.0 POST-MENOPAUSAL BLEEDING: Primary | ICD-10-CM

## 2024-04-02 DIAGNOSIS — M62.838 CERVICAL PARASPINAL MUSCLE SPASM: ICD-10-CM

## 2024-04-02 DIAGNOSIS — M54.2 ACUTE NECK PAIN: ICD-10-CM

## 2024-04-02 DIAGNOSIS — S06.0X0A CONCUSSION WITHOUT LOSS OF CONSCIOUSNESS, INITIAL ENCOUNTER: ICD-10-CM

## 2024-04-02 PROCEDURE — 99214 OFFICE O/P EST MOD 30 MIN: CPT | Performed by: NURSE PRACTITIONER

## 2024-04-02 RX ORDER — CYCLOBENZAPRINE HCL 5 MG
5 TABLET ORAL 3 TIMES DAILY PRN
Qty: 30 TABLET | Refills: 0 | Status: SHIPPED | OUTPATIENT
Start: 2024-04-02

## 2024-04-02 NOTE — ASSESSMENT & PLAN NOTE
Check pelvic US and labs  Will then decide GYN eval and pap  Last pap 6/2019 pap  w/ HPV normal, due in 6/2024

## 2024-04-02 NOTE — PROGRESS NOTES
Name: Kerline Garcia      : 1965      MRN: [id number]  Encounter Provider: SUDHAKAR Adams  Encounter Date: 2024   Encounter department: [facility]    Assessment & Plan     1. Post-menopausal bleeding  Assessment & Plan:  Check pelvic US and labs  Will then decide GYN eval and pap  Last pap 2019 pap  w/ HPV normal, due in 2024    Orders:  -     FSH and LH; Future  -     Estrogens, total; Future  -     US pelvis complete w transvaginal; Future; Expected date: 2024  -     FSH and LH  -     Estrogens, total  -     TSH, 3rd generation with Free T4 reflex; Future  -     TSH, 3rd generation with Free T4 reflex    2. Cervical paraspinal muscle spasm  Assessment & Plan:  Flexeril three times daily as needed spasms, may cause drowsiness  Start PT    Orders:  -     cyclobenzaprine (FLEXERIL) 5 mg tablet; Take 1 tablet (5 mg total) by mouth 3 (three) times a day as needed for muscle spasms  -     Ambulatory Referral to Physical Therapy; Future    3. Concussion without loss of consciousness, initial encounter  Assessment & Plan:  Recommend PT for concussion therapy- she will look into this in [facility] due to work    Orders:  -     Ambulatory Referral to Physical Therapy; Future    4. Acute neck pain  Assessment & Plan:  Warm compresses, gentle stretching, range of motion  Proper ergonomics when sitting, watch positioning of neck  Flexeril as needed spasms  Start PT    Orders:  -     Ambulatory Referral to Physical Therapy; Future         Subjective        Had post menopausal bleeding. Hasn't called her GYN. Noticed yesterday. Had similar episode back in 2019. Last few weeks was breaking out on her tzone and had breast tenderness. Used a panty liner, bright red blood. One moment of bleeding, nothing today. Her last period was 2022.    Still dealing with anxiety, getting a tad better since concussion, neck is still bothering her. She is watching her posturing, yoga  "stretches, not getting better. Takes advil and tylenol. Feels pain radiate down into arms at times. Feels muscle tightness at base of skull. Concussion still losing train of thought. Not as much noise sensitivity        Review of Systems   Constitutional: Negative.    HENT:  Positive for tinnitus.    Respiratory: Negative.     Cardiovascular: Negative.    Gastrointestinal: Negative.    Genitourinary:  Positive for vaginal bleeding.        Has pre menstrual symptoms leading up to bleeding- breast tenderness, bloating, mood swings- resolved after bleeding yesterday   Musculoskeletal:  Positive for arthralgias, myalgias and neck pain.   Neurological:  Negative for weakness and headaches.   Psychiatric/Behavioral:  The patient is nervous/anxious.        Current Outpatient Medications on File Prior to Visit   Medication Sig   • Acetaminophen (TYLENOL PO) Take by mouth   • Ibuprofen (ADVIL PO) Take by mouth       Objective     /90   Pulse 89   Temp (!) 96.7 °F (35.9 °C) (Tympanic)   Ht 5' 5\" (1.651 m)   Wt 65.8 kg (145 lb)   SpO2 99%   BMI 24.13 kg/m²     Physical Exam  Vitals and nursing note reviewed.   Constitutional:       General: She is not in acute distress.     Appearance: Normal appearance. She is normal weight.   HENT:      Head: Normocephalic.   Eyes:      Extraocular Movements: Extraocular movements intact.      Conjunctiva/sclera: Conjunctivae normal.      Pupils: Pupils are equal, round, and reactive to light.   Neck:      Comments: Right paraspinal tenderness and spasm into right upper trazpezius  Cardiovascular:      Rate and Rhythm: Normal rate and regular rhythm.      Pulses:           Radial pulses are 1+ on the right side and 1+ on the left side.      Heart sounds: Normal heart sounds. No murmur heard.  Pulmonary:      Effort: Pulmonary effort is normal.      Breath sounds: Normal breath sounds.   Abdominal:      General: Abdomen is flat. Bowel sounds are normal.      Palpations: Abdomen is " soft.      Tenderness: There is no right CVA tenderness, left CVA tenderness, guarding or rebound.   Musculoskeletal:      Cervical back: Normal range of motion. Tenderness present.      Right lower leg: No edema.      Left lower leg: No edema.   Skin:     General: Skin is warm and dry.   Neurological:      General: No focal deficit present.      Mental Status: She is alert and oriented to person, place, and time.      Cranial Nerves: Cranial nerves 2-12 are intact. No cranial nerve deficit.      Sensory: No sensory deficit.      Motor: Motor function is intact. No weakness.      Coordination: Coordination is intact. Romberg sign negative. Coordination normal.      Gait: Gait is intact. Gait normal.      Deep Tendon Reflexes: Reflexes normal.   Psychiatric:         Mood and Affect: Mood normal.         Behavior: Behavior normal.         Thought Content: Thought content normal.         Judgment: Judgment normal.       SUDHAKAR Adams

## 2024-04-02 NOTE — ASSESSMENT & PLAN NOTE
Warm compresses, gentle stretching, range of motion  Proper ergonomics when sitting, watch positioning of neck  Flexeril as needed spasms  Start PT

## 2024-04-10 LAB — TSH SERPL DL<=0.005 MIU/L-ACNC: 0.01 UIU/ML (ref 0.45–4.5)

## 2024-04-11 DIAGNOSIS — E05.90 HYPERTHYROIDISM: Primary | ICD-10-CM

## 2024-04-11 NOTE — RESULT ENCOUNTER NOTE
Simone Churchill, Your thyroid test shows your thyroid is overactive. I am still waiting on your hormone levels to come back. I would like you to complete a thyroid ultrasound to check your thyroid and complete additional testing on your thyroid. This could be causing some of your anxiety recently. Once we have those additional tests I would like you to see an endocrinologist. I will put the orders in for you to do blood work (non fasting), and thyroid ultrasound. Please call central scheduling to arrange appointment for ultrasound [phone]

## 2024-04-12 ENCOUNTER — TELEPHONE (OUTPATIENT)
Dept: FAMILY MEDICINE CLINIC | Facility: CLINIC | Age: 59
End: 2024-04-12

## 2024-04-12 NOTE — TELEPHONE ENCOUNTER
----- Message from SUDHAKAR Adams sent at 4/11/2024 12:16 PM EDT -----  Simone Churchill, Your thyroid test shows your thyroid is overactive. I am still waiting on your hormone levels to come back. I would like you to complete a thyroid ultrasound to check your thyroid and complete additional testing on your thyroid. This could be causing some of your anxiety recently. Once we have those additional tests I would like you to see an endocrinologist. I will put the orders in for you to do blood work (non fasting), and thyroid ultrasound. Please call central scheduling to arrange appointment for ultrasound [phone]

## 2024-04-13 LAB
ESTROGEN SERPL-MCNC: 70 PG/ML (ref 40–244)
FSH SERPL-ACNC: 69.9 MIU/ML
LH SERPL-ACNC: 49.5 MIU/ML

## 2024-04-15 ENCOUNTER — TELEPHONE (OUTPATIENT)
Dept: FAMILY MEDICINE CLINIC | Facility: CLINIC | Age: 59
End: 2024-04-15

## 2024-04-15 NOTE — TELEPHONE ENCOUNTER
----- Message from SUDHAKAR Adams sent at 4/15/2024 12:36 PM EDT -----  Your hormone levels for LH and FSH appear to be in post menopausal range. Recommend scheduling your ultrasound of pelvis and also of your thyroid based off that lab abnormality.

## 2024-04-24 LAB
T4BG SERPL-MCNC: 21 UG/ML (ref 13–39)
THYROPEROXIDASE AB SERPL-ACNC: 12 IU/ML (ref 0–34)
TSI SER-ACNC: 2.86 IU/L (ref 0–0.55)

## 2024-04-28 ENCOUNTER — HOSPITAL ENCOUNTER (OUTPATIENT)
Dept: ULTRASOUND IMAGING | Facility: HOSPITAL | Age: 59
Discharge: HOME | End: 2024-04-28
Payer: COMMERCIAL

## 2024-04-28 DIAGNOSIS — E05.90 HYPERTHYROIDISM: ICD-10-CM

## 2024-04-28 PROCEDURE — 76536 US EXAM OF HEAD AND NECK: CPT

## 2024-05-04 DIAGNOSIS — E05.90 HYPERTHYROIDISM: Primary | ICD-10-CM

## 2024-05-06 ENCOUNTER — TELEPHONE (OUTPATIENT)
Dept: FAMILY MEDICINE CLINIC | Facility: CLINIC | Age: 59
End: 2024-05-06

## 2024-05-06 NOTE — TELEPHONE ENCOUNTER
----- Message from SUDHAKAR Adams sent at 5/4/2024 10:48 AM EDT -----  Your thyroid blood work shows your thyroid stimulating immunoglobulin is elevated which indicates is telling your thyroid to become more active and release thyroid hormone in your blood. I recommend you consult with an endocrinologist. I put a referral in for you

## 2024-05-06 NOTE — PROGRESS NOTES
Kerline Garcia 58 y.o. female MRN: [id number]    Encounter: [id number]      Assessment/Plan     Assessment:  This is a 58 y.o.-year-old female with newly diagnosed Graves' disease.  She does have multiple other constitutional symptoms, that do not match the timeline of abnormal thyroid lab work, but recently increased anxiety, palpitations, globus sensation may be related.    Discussed the 3 potential treatment options,  antithyroid medications, radioactive iodine ablation, and surgery, and risks and benefits thereof. Patient elects to start antithyroid medication therapy.  Discussed potential side effects to look out for including rash, agranulocytosis, liver dysfunction.     Plan:  --Will update labs, TSH, free T4, T3  -- Depending on above values, will choose methimazole dosing  -- Will start propranolol 10 mg twice daily for palpitations.  Counseled patient to watch for lightheadedness and low blood pressure.  -- Doubt at this time the patient's eye symptoms are related to Graves' eye disease but have encouraged patient to follow-up with her ophthalmology group and inform them that she now has a Graves' diagnosis, and see if they recommend any further evaluation of her dry eye.  --3-4m followup     CC: Abnormal thyroid labs    History of Present Illness     HPI:  58 y.o. female presenting for recent abnormal thyroid labs. Reports has has had many disparate symptoms, going back several years, but thyroid function has been normal up through November 2023.  2021 first COVID infection   LMP September 2022 4/2022 started w diffuse hair loss, lost eyelashes, started latisse , itchy rash neck and shin rash  2/2023 started with GI issues , maybe almond allergy, maybe norovirus? Vomiting and diarrhea episodes, reoccurring every few months . Not changed last few months.  Feels better since restricting gluten and sugar  8/2023 Shingles, noticed trouble swallowing pills  10/2023 COVID again   1/2024 Concussion  , increased  anxiety, brain fog     4/22/2024 thyroxine binding globulin 21 TPO negative TSI 2.86  4/9/2024 TSH 0.007   11/2/2023 TSH 0.675  5/9/2019 TSH 4.1 Free T4 1.15  7/2018 TSH 3.08    4/28/2024 thyroid ultrasound normal size, homogenous smooth no nodules  11/10/2018 thyroid ultrasound normal size, homogenous smooth no nodules    Mom w hypothyoid, RA  Maternal Aunt with hemithyroidectomy  Maternal GGM w goiter    Review of Systems   Constitutional:  Positive for appetite change (decreased in past year) and fatigue. Negative for activity change and unexpected weight change.   HENT:  Positive for sore throat and trouble swallowing (pills). Negative for sinus pressure and voice change.    Eyes:  Positive for pain (left eye) and visual disturbance.        + dry eye since 2022, w irritation, redness, scratchiness, Systane helped    Cardiovascular:  Negative for chest pain and leg swelling.   Gastrointestinal:  Positive for abdominal distention, abdominal pain, constipation (basleline) and diarrhea (intmt).   Endocrine: Positive for heat intolerance, polydipsia and polyuria. Negative for cold intolerance and polyphagia.        +sweating episodes  , improving , more when anxious    Genitourinary:  Positive for frequency.   Musculoskeletal:  Positive for arthralgias (hands).   Skin:  Positive for rash. Negative for pallor.   Neurological:  Positive for tremors. Negative for dizziness, light-headedness and headaches.   Hematological:  Positive for adenopathy.   Psychiatric/Behavioral:  Positive for sleep disturbance (wakes up to go to bathroom to pee). The patient is nervous/anxious (increased last few months).      Historical Information   Past Medical History:   Diagnosis Date    Closed left arm fracture     Last assessed 10/24/2017      Past Surgical History:   Procedure Laterality Date    LYMPHADENECTOMY      Last assessed 10/24/2017     TONSILLECTOMY AND ADENOIDECTOMY      Last assessed 10/24/2017      Social History   Social  "History     Substance and Sexual Activity   Alcohol Use Yes    Comment: 1-4 drinks/week - As per [employer]      Social History     Substance and Sexual Activity   Drug Use Never     Social History     Tobacco Use   Smoking Status Never    Passive exposure: Never   Smokeless Tobacco Never     Family History:   Family History   Problem Relation Age of Onset    Arthritis Mother         RA    Hypertension Mother     Thyroid disease Mother     Autoimmune disease Mother     Multiple myeloma Father     Thyroid cancer Maternal Grandmother     Alcohol abuse Maternal Grandfather        Meds/Allergies   Current Outpatient Medications   Medication Sig Dispense Refill    Ibuprofen (ADVIL PO) Take by mouth       No current facility-administered medications for this visit.     No Known Allergies    Objective   Vitals: Blood pressure 138/82, height 5' 5\" (1.651 m), weight 63.4 kg (139 lb 12.8 oz).    Physical Exam  Constitutional:       General: She is not in acute distress.     Appearance: Normal appearance. She is not ill-appearing, toxic-appearing or diaphoretic.   HENT:      Head: Normocephalic and atraumatic.      Nose: Nose normal.   Eyes:      Extraocular Movements: Extraocular movements intact.      Pupils: Pupils are equal, round, and reactive to light.      Comments: No lid lag  Faint conjunctival erythema    Neck:      Thyroid: No thyroid mass, thyromegaly or thyroid tenderness.   Cardiovascular:      Rate and Rhythm: Regular rhythm. Tachycardia present.   Pulmonary:      Effort: Pulmonary effort is normal. No respiratory distress.      Breath sounds: No stridor. No wheezing, rhonchi or rales.   Chest:      Chest wall: No tenderness.   Abdominal:      General: There is no distension.   Musculoskeletal:         General: No deformity.      Right lower leg: No edema.      Left lower leg: No edema.   Skin:     General: Skin is warm and dry.      Comments: + shiny plaque right shin 4in x 6 in    Neurological:      General: " "No focal deficit present.      Mental Status: She is alert. Mental status is at baseline.      Motor: Tremor present.      Deep Tendon Reflexes:      Reflex Scores:       Tricep reflexes are 2+ on the right side and 2+ on the left side.       Bicep reflexes are 2+ on the right side and 2+ on the left side.       Brachioradialis reflexes are 2+ on the right side and 2+ on the left side.  Psychiatric:         Mood and Affect: Mood normal.         Behavior: Behavior normal.         Thought Content: Thought content normal.         The history was obtained from the review of the chart, patient.    Lab Results:        Imaging Studies:   Results for orders placed during the hospital encounter of 04/28/24    US thyroid    Impression  Normal exam.        Reference: ACR Thyroid Imaging, Reporting and Data System (TI-RADS): White Paper of the ACR TI-RADS Committee. J AM Iman Radiol 2017;14:587-595. Additional recommendations based on American Thyroid Association 2015 guidelines.      Workstation performed: [id number]      I have personally reviewed pertinent reports.      Portions of the record may have been created with voice recognition software. Occasional wrong word or \"sound a like\" substitutions may have occurred due to the inherent limitations of voice recognition software. Read the chart carefully and recognize, using context, where substitutions have occurred.    "

## 2024-05-07 ENCOUNTER — OFFICE VISIT (OUTPATIENT)
Dept: ENDOCRINOLOGY | Facility: CLINIC | Age: 59
End: 2024-05-07
Payer: COMMERCIAL

## 2024-05-07 ENCOUNTER — VBI (OUTPATIENT)
Dept: ADMINISTRATIVE | Facility: OTHER | Age: 59
End: 2024-05-07

## 2024-05-07 VITALS
HEIGHT: 65 IN | WEIGHT: 139.8 LBS | BODY MASS INDEX: 23.29 KG/M2 | DIASTOLIC BLOOD PRESSURE: 82 MMHG | SYSTOLIC BLOOD PRESSURE: 138 MMHG

## 2024-05-07 DIAGNOSIS — E05.00 GRAVES DISEASE: Primary | ICD-10-CM

## 2024-05-07 DIAGNOSIS — R00.0 TACHYCARDIA: ICD-10-CM

## 2024-05-07 PROCEDURE — 99204 OFFICE O/P NEW MOD 45 MIN: CPT | Performed by: INTERNAL MEDICINE

## 2024-05-07 RX ORDER — PROPRANOLOL HYDROCHLORIDE 10 MG/1
10 TABLET ORAL 2 TIMES DAILY
Qty: 60 TABLET | Refills: 3 | Status: SHIPPED | OUTPATIENT
Start: 2024-05-07

## 2024-05-07 NOTE — PATIENT INSTRUCTIONS
--notify your ophthalmologist about your Grave's diagnosis. If they don't have a specialist, we can refer up here as well.   --start propranolol 10 mg twice daily (beta blocker). Watch for low blood pressure or lightheadedness   --get labs! We will decide the methimazole dose after this returns

## 2024-05-23 DIAGNOSIS — E05.00 GRAVES DISEASE: Primary | ICD-10-CM

## 2024-05-23 LAB
T3 SERPL-MCNC: 121 NG/DL (ref 71–180)
T4 FREE SERPL DIALY-MCNC: 1.8 NG/DL
TSH SERPL-ACNC: <0.01 UU/ML

## 2024-05-23 RX ORDER — METHIMAZOLE 5 MG/1
TABLET ORAL
Qty: 30 TABLET | Refills: 2 | Status: SHIPPED | OUTPATIENT
Start: 2024-05-23

## 2024-06-25 DIAGNOSIS — E05.00 GRAVES DISEASE: Primary | ICD-10-CM

## 2024-06-25 LAB
T4 FREE SERPL-MCNC: 1.45 NG/DL (ref 0.82–1.77)
TSH SERPL DL<=0.005 MIU/L-ACNC: 0.04 UIU/ML (ref 0.45–4.5)

## 2024-07-04 ENCOUNTER — AMB VIDEO VISIT (OUTPATIENT)
Dept: OTHER | Facility: HOSPITAL | Age: 59
End: 2024-07-04

## 2024-07-04 PROCEDURE — ECARE PR SL URGENT CARE VIRTUAL VISIT: Performed by: FAMILY MEDICINE

## 2024-07-04 NOTE — CARE ANYWHERE EVISITS
Visit Summary for ANNY VAUGHAN - Gender: Female - YOB: 1965  Date: [id number] - Duration: 8 minutes  Patient: ANNY VAUGHAN  Provider: Boom Gould    Patient Contact Information  Address  [address] [place]; [place] [address]  [phone]    Visit Topics  Rash on upper right thigh [Added By: Self - 2024]    Triage Questions   What is your current physical address in the event of a medical emergency? Answer []  Are you allergic to any medications? Answer []  Are you now or could you be pregnant? Answer []  Do you have any immune system compromise or chronic lung   disease? Answer []  Do you have any vulnerable family members in the home (infant, pregnant, cancer, elderly)? Answer []     Conversation Transcripts  [0A][0A] [Notification] You are connected with Boom Gould Family Physician.[0A][Notification] ANNY VAUGHAN is located in [place].[0A][Notification] ANNY VAUGHAN has shared health history...[0A]    Diagnosis  Insect bite (nonvenomous), right hip, initial encounter    Procedures  Value: [id number] Code: CPT-4 UNLISTED E&M SERVICE    Medications Prescribed    clobetasol      Frequency :   Patient Instructions : Apply to affected area twice daily for up to 14 days in a row as needed for itch/rash.  Refills : 0  Instructions to the Pharmacist : Substitutions allowed      Provider Notes  [0A][0A] We strongly encourage you to share the[0A]following record of today's visit with your primary care physician. [0A][0A][0A][0A]Contact phone number: see intake[0A][0A]Mode of Communication: video / [place],[[place]name and    confirmed[0A][0A][0A][0A]HPI:  57 yo reports a rash on right thigh/ right hip area. She states that she started to feel a progressively worse  burning sensation yesterday.  When she looked she saw a circular small red area. Today the area is expanded   greatly. Is red and warm and sensitive to touch.   No fever.  No n/v/d.   no other recent concerns , only that 2 days ago had  some hyperthyroid symptoms that resolved - racing heart beat, loose bowel x 1, nausea.Had chicken pox. Had shingle on arm last   year. [0A][0A][0A]PMH: Grave's [0A][0A]PSH /hospitalization: none recent[0A][0A]Meds: methimazole- started 5 weeks ago[0A][0A]Allergies: nkda[0A]nonsmoker[0A]LMP -[0A]n/a >49yo[0A]Exam: [0A][0A]Gen: Alert, normal mental status and interaction, no visible   distress, non-[0A]toxic appearance. [0A][0A]Skin- right lateral hip area with 15-20cm circular lesion, with 1-2 cm surrounding lighter erythema, centrally there is what appears to be a bite/puncture wound 2-3mm/scabbed[0A][0A]Assessment: S70.261A Insect   bite (nonvenomous), right hip, initial encounter with localized histamine reactionsuspect wasp or bee sting[0A]Plan:  [0A][0A]1. I am sending you a prescription as described below. [0A]clobetasol 0.05 % topical creamAdditional instructions: Apply to   affected area twice daily for up to 14 days in a row as needed for itch/rash. [0A]2. Discussed[0A]precautions. Claritin or zyrtec[0A]10mg daily x one weekBenadryl 25-50mg at[0A]night as needed for swelling/itchIce the area 15[0A]minutes at a time as   needed throughout the day[0A]Monitor for signs of increasing infection[0A]eg. fever, increasing redness/pain/warmthWash only with water. Keep covered with[0A]bandage if drainage[0A][0A]Tylenol ( or generic acetaminophen) 500-650mg every 6 hours   as[0A]needed for pain/fevers/aches[0A][0A]Ibuprofen 400-600mg every 6hours as needed for pain/fever, take[0A]with foodGo be seen in person if worsening. [0A][0A]Follow up:[0A][0A]1. If there are any questions or problems with the prescription, call   [phone][0A]anytime for assistance. [0A][0A]2. Please re-connect for another online visit or see an in-person provider[0A]should your symptoms worsen or persist. [0A] Please print a copy of this note and send it to your regular doctor, or take[0A]it   to your next visit so it may be included in your  medical record. [0A][0A][0A][0A]Patient voiced understanding and agrees to plan.[0A][0A]Please see your PCP on an annual basis[0A]    Electronically signed by: Boom Gould(NPI [id number])

## 2024-07-05 ENCOUNTER — OFFICE VISIT (OUTPATIENT)
Dept: FAMILY MEDICINE CLINIC | Facility: CLINIC | Age: 59
End: 2024-07-05
Payer: COMMERCIAL

## 2024-07-05 VITALS
OXYGEN SATURATION: 99 % | BODY MASS INDEX: 23.32 KG/M2 | WEIGHT: 140 LBS | HEART RATE: 79 BPM | HEIGHT: 65 IN | DIASTOLIC BLOOD PRESSURE: 72 MMHG | TEMPERATURE: 98 F | SYSTOLIC BLOOD PRESSURE: 120 MMHG

## 2024-07-05 DIAGNOSIS — J02.9 ACUTE PHARYNGITIS, UNSPECIFIED ETIOLOGY: ICD-10-CM

## 2024-07-05 DIAGNOSIS — A69.20 ERYTHEMA MIGRANS (LYME DISEASE): Primary | ICD-10-CM

## 2024-07-05 PROCEDURE — 99213 OFFICE O/P EST LOW 20 MIN: CPT | Performed by: NURSE PRACTITIONER

## 2024-07-05 RX ORDER — DOXYCYCLINE HYCLATE 100 MG
100 TABLET ORAL 2 TIMES DAILY
Qty: 28 TABLET | Refills: 0 | Status: SHIPPED | OUTPATIENT
Start: 2024-07-05 | End: 2024-07-19 | Stop reason: SDUPTHER

## 2024-07-05 RX ORDER — CLOBETASOL PROPIONATE 0.5 MG/G
CREAM TOPICAL
COMMUNITY
Start: 2024-07-04

## 2024-07-05 NOTE — PROGRESS NOTES
"Ambulatory Visit  Name: Kerline Garcia      : 1965      MRN: [id number]  Encounter Provider: SUDHAKAR Adams  Encounter Date: 2024   Encounter department: [facility]    Assessment & Plan   1. Erythema migrans (Lyme disease)  Assessment & Plan:  Doxcycyline 100mg BID x 14 days  Avoid prime sun hours  Wear sun protection- hat, sunscreen if outdoors  Orders:  -     doxycycline hyclate (VIBRA-TABS) 100 mg tablet; Take 1 tablet (100 mg total) by mouth 2 (two) times a day for 14 days  2. Acute pharyngitis, unspecified etiology  -     doxycycline hyclate (VIBRA-TABS) 100 mg tablet; Take 1 tablet (100 mg total) by mouth 2 (two) times a day for 14 days     History of Present Illness   {Disappearing Hyperlinks I Encounters * My Last Note * Since Last Visit * History :14297}  Here today for a rash on her right hip since  this week, felt area was an itch then became more problematic. Did telehealth visit yesterday was given clobetasol cream which is helping with rash  Felt she had a fever this morning and now with a sore throat, slight headache- no runny or stuffy nose. No upset stomach          Rash  This is a new problem. The current episode started in the past 7 days. The problem has been gradually worsening since onset. The affected locations include the right upper leg. The rash is characterized by burning, pain, redness and swelling. It is unknown if there was an exposure to a precipitant. Associated symptoms include a fever, rhinorrhea and a sore throat.       Review of Systems   Constitutional:  Positive for fever.   HENT:  Positive for rhinorrhea and sore throat.    Skin:  Positive for rash.       Objective   {Disappearing Hyperlinks   Review Vitals * Enter New Vitals * Results Review * Labs * Imaging * Cardiology * Procedures * Lung Cancer Screening :21546}  /72   Pulse 79   Temp 98 °F (36.7 °C) (Tympanic)   Ht 5' 5\" (1.651 m)   Wt 63.5 kg (140 lb)   SpO2 99%  "  BMI 23.30 kg/m²     Physical Exam  Vitals reviewed.   Constitutional:       Appearance: Normal appearance. She is normal weight.   HENT:      Head: Normocephalic.      Right Ear: Tympanic membrane, ear canal and external ear normal.      Left Ear: Tympanic membrane, ear canal and external ear normal.      Nose: Nose normal.      Mouth/Throat:      Mouth: Mucous membranes are moist.      Pharynx: Posterior oropharyngeal erythema present.   Eyes:      Conjunctiva/sclera: Conjunctivae normal.      Pupils: Pupils are equal, round, and reactive to light.   Cardiovascular:      Rate and Rhythm: Normal rate and regular rhythm.      Heart sounds: Normal heart sounds.   Pulmonary:      Breath sounds: Normal breath sounds.   Abdominal:      Palpations: Abdomen is soft.   Skin:     Findings: Rash present.      Comments: Erythema migrans on right anterior hip   Neurological:      Mental Status: She is alert and oriented to person, place, and time.   Psychiatric:         Mood and Affect: Mood normal.         Behavior: Behavior normal.       Administrative Statements {Disappearing Hyperlinks I  Level of Service * [id number]}          Answers submitted by the patient for this visit:  Rash Questionnaire (Submitted on 7/5/2024)  Chief Complaint: Rash

## 2024-07-08 DIAGNOSIS — E05.00 GRAVES DISEASE: ICD-10-CM

## 2024-07-08 DIAGNOSIS — R13.10 ODYNOPHAGIA: Primary | ICD-10-CM

## 2024-07-09 PROBLEM — A69.20 ERYTHEMA MIGRANS (LYME DISEASE): Status: ACTIVE | Noted: 2024-07-09

## 2024-07-09 NOTE — ASSESSMENT & PLAN NOTE
Doxcycyline 100mg BID x 14 days  Avoid prime sun hours  Wear sun protection- hat, sunscreen if outdoors

## 2024-07-15 LAB
BASOPHILS # BLD AUTO: 0.1 X10E3/UL (ref 0–0.2)
BASOPHILS NFR BLD AUTO: 1 %
EOSINOPHIL # BLD AUTO: 0.2 X10E3/UL (ref 0–0.4)
EOSINOPHIL NFR BLD AUTO: 4 %
ERYTHROCYTE [DISTWIDTH] IN BLOOD BY AUTOMATED COUNT: 12.1 % (ref 11.7–15.4)
HCT VFR BLD AUTO: 40.8 % (ref 34–46.6)
HGB BLD-MCNC: 13.8 G/DL (ref 11.1–15.9)
IMM GRANULOCYTES # BLD: 0 X10E3/UL (ref 0–0.1)
IMM GRANULOCYTES NFR BLD: 0 %
LYMPHOCYTES # BLD AUTO: 1.8 X10E3/UL (ref 0.7–3.1)
LYMPHOCYTES NFR BLD AUTO: 36 %
MCH RBC QN AUTO: 30.9 PG (ref 26.6–33)
MCHC RBC AUTO-ENTMCNC: 33.8 G/DL (ref 31.5–35.7)
MCV RBC AUTO: 92 FL (ref 79–97)
MONOCYTES # BLD AUTO: 0.5 X10E3/UL (ref 0.1–0.9)
MONOCYTES NFR BLD AUTO: 9 %
NEUTROPHILS # BLD AUTO: 2.6 X10E3/UL (ref 1.4–7)
NEUTROPHILS NFR BLD AUTO: 50 %
PLATELET # BLD AUTO: 289 X10E3/UL (ref 150–450)
RBC # BLD AUTO: 4.46 X10E6/UL (ref 3.77–5.28)
WBC # BLD AUTO: 5.1 X10E3/UL (ref 3.4–10.8)

## 2024-07-18 ENCOUNTER — TELEPHONE (OUTPATIENT)
Age: 59
End: 2024-07-18

## 2024-07-18 NOTE — TELEPHONE ENCOUNTER
Patient was being treated for lyme disease.  Today is the last day for the medicaitoa and pateint can still see the rash and wanted to sunita Nanda know.  Please advise.  Please return patients call.

## 2024-07-19 DIAGNOSIS — J02.9 ACUTE PHARYNGITIS, UNSPECIFIED ETIOLOGY: ICD-10-CM

## 2024-07-19 DIAGNOSIS — A69.20 ERYTHEMA MIGRANS (LYME DISEASE): ICD-10-CM

## 2024-07-19 RX ORDER — DOXYCYCLINE HYCLATE 100 MG
100 TABLET ORAL 2 TIMES DAILY
Qty: 14 TABLET | Refills: 0 | Status: SHIPPED | OUTPATIENT
Start: 2024-07-19 | End: 2024-07-26

## 2024-07-26 NOTE — TELEPHONE ENCOUNTER
She does not need further medication as she was treated for 21 days. Recommend she monitor for worsening rash and follow up with office visit or send a picture if rash is still present next week

## 2024-07-26 NOTE — TELEPHONE ENCOUNTER
Patient called she took last dose of doxycycline 100 mg today.  She said she can still see where the tick was and outline of rash.  She wanted to make sure that was ok?    Confirmed [facility] pharmacy is [facility]    Thank you

## 2024-08-15 DIAGNOSIS — E05.00 GRAVES DISEASE: ICD-10-CM

## 2024-08-15 RX ORDER — METHIMAZOLE 5 MG/1
TABLET ORAL
Qty: 30 TABLET | Refills: 2 | Status: SHIPPED | OUTPATIENT
Start: 2024-08-15

## 2024-08-20 LAB
T4 FREE SERPL-MCNC: 1.08 NG/DL (ref 0.82–1.77)
TSH SERPL DL<=0.005 MIU/L-ACNC: 1.54 UIU/ML (ref 0.45–4.5)

## 2024-09-03 ENCOUNTER — OFFICE VISIT (OUTPATIENT)
Dept: ENDOCRINOLOGY | Facility: CLINIC | Age: 59
End: 2024-09-03
Payer: COMMERCIAL

## 2024-09-03 VITALS
HEIGHT: 65 IN | WEIGHT: 147.2 LBS | SYSTOLIC BLOOD PRESSURE: 120 MMHG | DIASTOLIC BLOOD PRESSURE: 80 MMHG | BODY MASS INDEX: 24.53 KG/M2 | HEART RATE: 77 BPM | OXYGEN SATURATION: 99 %

## 2024-09-03 DIAGNOSIS — E05.00 GRAVES DISEASE: Primary | ICD-10-CM

## 2024-09-03 PROCEDURE — 99213 OFFICE O/P EST LOW 20 MIN: CPT

## 2024-09-03 NOTE — PROGRESS NOTES
"Established Patient Progress Note      Chief Complaint   Patient presents with    Hyperthyroidism        Impression & Plan:    Problem List Items Addressed This Visit          Endocrine    Graves disease - Primary     Thyroid function improved and now within target range. She will resume methimazole at current dose. Plan to repeat labs again in about 8 weeks. If TSH continues to trend up will plan to decrease methimazole dose as tolerated.          Relevant Orders    TSH, 3rd generation    T4, free       History of Present Illness:   Kerline Garcia is a 58 y.o. female with hyperthyroidism due to Graves' disease seen in follow up.     She had a low TSH in April 2024 (0.007) checked in response to symptoms. She had some symptoms for 1-2 years, but in the past few months noticed more anxiety, tremors, feeling \"jittery,\" and difficulties swallowing pills. Her appetite has also been poor though she has not lost weight.     TSI elevated, TPO negative. Thyroid US normal. No symptoms of thyroid eye disease.     Was started on methimazole at 5 mg daily. She continues on this dose. She denies rash, fever or sore throat. She feels well and reports initial symptoms have resolved. She does report about 8 pound weight gain in past few months. No changes to diet or exercise.       Patient Active Problem List   Diagnosis    Abnormal uterine bleeding    Herpes zoster without complication    Head injury    Contusion of right little finger without damage to nail    Right forearm pain    Acute neck pain    Post-menopausal bleeding    Cervical paraspinal muscle spasm    Concussion with no loss of consciousness    Erythema migrans (Lyme disease)    Graves disease      Past Medical History:   Diagnosis Date    Closed left arm fracture     Last assessed 10/24/2017       Past Surgical History:   Procedure Laterality Date    LYMPHADENECTOMY      Last assessed 10/24/2017     TONSILLECTOMY AND ADENOIDECTOMY      Last assessed 10/24/2017     " "  Family History   Problem Relation Age of Onset    Arthritis Mother         RA    Hypertension Mother     Thyroid disease Mother     Autoimmune disease Mother     Multiple myeloma Father     Thyroid cancer Maternal Grandmother     Alcohol abuse Maternal Grandfather      Social History     Tobacco Use    Smoking status: Never     Passive exposure: Never    Smokeless tobacco: Never   Substance Use Topics    Alcohol use: Yes     Comment: 1-4 drinks/week - As per [employer]      No Known Allergies      Current Outpatient Medications:     Ibuprofen (ADVIL PO), Take by mouth, Disp: , Rfl:     methimazole (TAPAZOLE) 5 mg tablet, take 1 tablet by mouth once daily, Disp: 30 tablet, Rfl: 2    clobetasol (TEMOVATE) 0.05 % cream, apply to affected area twice a day for 14 days IN A ROW if needed for itching rash (Patient not taking: Reported on 9/3/2024), Disp: , Rfl:     Review of Systems   Constitutional:  Positive for unexpected weight change (8 pound weight gain). Negative for appetite change, chills, fatigue and fever.   HENT:  Negative for sore throat, trouble swallowing and voice change.    Eyes:  Negative for visual disturbance.   Cardiovascular:  Negative for palpitations.   Gastrointestinal:  Negative for diarrhea.   Endocrine: Negative for cold intolerance and heat intolerance.   Musculoskeletal:  Negative for arthralgias.   Skin:  Negative for rash.   Neurological:  Negative for dizziness, tremors and weakness.   Psychiatric/Behavioral:  Negative for sleep disturbance. The patient is not nervous/anxious.    All other systems reviewed and are negative.      Physical Exam:  Body mass index is 24.5 kg/m².  /80   Pulse 77   Ht 5' 5\" (1.651 m)   Wt 66.8 kg (147 lb 3.2 oz)   SpO2 99%   BMI 24.50 kg/m²    Wt Readings from Last 3 Encounters:   09/03/24 66.8 kg (147 lb 3.2 oz)   07/05/24 63.5 kg (140 lb)   05/07/24 63.4 kg (139 lb 12.8 oz)       Physical Exam  Vitals reviewed.   Constitutional:       Appearance: " Normal appearance. She is normal weight.   Cardiovascular:      Rate and Rhythm: Normal rate.   Pulmonary:      Effort: Pulmonary effort is normal.   Neurological:      Mental Status: She is alert and oriented to person, place, and time.   Psychiatric:         Mood and Affect: Mood normal.         Thought Content: Thought content normal.         Labs:     Component      Latest Ref Rng 5/15/2024 6/24/2024 7/15/2024 8/19/2024   White Blood Cell Count      3.4 - 10.8 x10E3/uL   5.1     Red Blood Cell Count      3.77 - 5.28 x10E6/uL   4.46     Hemoglobin      11.1 - 15.9 g/dL   13.8     HCT      34.0 - 46.6 %   40.8     MCV      79 - 97 fL   92     MCH      26.6 - 33.0 pg   30.9     MCHC.      31.5 - 35.7 g/dL   33.8     RDW      11.7 - 15.4 %   12.1     Platelet Count      150 - 450 x10E3/uL   289     Neutrophils      Not Estab. %   50     Lymphocytes      Not Estab. %   36     Monocytes      Not Estab. %   9     Eosinophils      Not Estab. %   4     Basophils PCT      Not Estab. %   1     Neutrophils (Absolute)      1.4 - 7.0 x10E3/uL   2.6     Lymphocytes (Absolute)      0.7 - 3.1 x10E3/uL   1.8     Monocytes (Absolute)      0.1 - 0.9 x10E3/uL   0.5     Eosinophils (Absolute)      0.0 - 0.4 x10E3/uL   0.2     Basophils ABS      0.0 - 0.2 x10E3/uL   0.1     Immature Granulocytes      Not Estab. %   0     Immature Granulocytes (Absolute)      0.0 - 0.1 x10E3/uL   0.0     TSH-ICMA      uU/mL <0.01 (L)       Free T4 by Dialysis/Mass Spec      ng/dL 1.8 (H)       T3, Total      71 - 180 ng/dL 121       TSH, POC      0.450 - 4.500 uIU/mL  0.038 (L)   1.540    FREE T4      0.82 - 1.77 ng/dL  1.45   1.08       Legend:  (L) Low  (H) High      There are no Patient Instructions on file for this visit.    Discussed with the patient and all questioned fully answered. She will call me if any problems arise.    SUDHAKAR Merchant

## 2024-09-03 NOTE — ASSESSMENT & PLAN NOTE
Thyroid function improved and now within target range. She will resume methimazole at current dose. Plan to repeat labs again in about 8 weeks. If TSH continues to trend up will plan to decrease methimazole dose as tolerated.

## 2024-12-09 ENCOUNTER — HOSPITAL ENCOUNTER (OUTPATIENT)
Dept: HOSPITAL 99 - RAD | Age: 59
End: 2024-12-09
Payer: COMMERCIAL

## 2024-12-09 DIAGNOSIS — N95.0: Primary | ICD-10-CM

## 2025-01-11 ENCOUNTER — LAB (OUTPATIENT)
Dept: LAB | Facility: HOSPITAL | Age: 60
End: 2025-01-11

## 2025-01-11 ENCOUNTER — TELEMEDICINE (OUTPATIENT)
Dept: OTHER | Facility: HOSPITAL | Age: 60
End: 2025-01-11
Payer: COMMERCIAL

## 2025-01-11 ENCOUNTER — RESULTS FOLLOW-UP (OUTPATIENT)
Dept: OTHER | Facility: HOSPITAL | Age: 60
End: 2025-01-11

## 2025-01-11 VITALS — TEMPERATURE: 97 F

## 2025-01-11 DIAGNOSIS — J02.9 PHARYNGITIS, UNSPECIFIED ETIOLOGY: Primary | ICD-10-CM

## 2025-01-11 DIAGNOSIS — J02.9 PHARYNGITIS, UNSPECIFIED ETIOLOGY: ICD-10-CM

## 2025-01-11 PROBLEM — S60.051A CONTUSION OF RIGHT LITTLE FINGER WITHOUT DAMAGE TO NAIL: Status: RESOLVED | Noted: 2024-02-16 | Resolved: 2025-01-11

## 2025-01-11 PROBLEM — A69.20 ERYTHEMA MIGRANS (LYME DISEASE): Status: RESOLVED | Noted: 2024-07-09 | Resolved: 2025-01-11

## 2025-01-11 PROBLEM — S06.0X0A CONCUSSION WITH NO LOSS OF CONSCIOUSNESS: Status: RESOLVED | Noted: 2024-04-02 | Resolved: 2025-01-11

## 2025-01-11 PROBLEM — S09.90XA HEAD INJURY: Status: RESOLVED | Noted: 2024-02-12 | Resolved: 2025-01-11

## 2025-01-11 PROBLEM — B02.9 HERPES ZOSTER WITHOUT COMPLICATION: Status: RESOLVED | Noted: 2023-09-10 | Resolved: 2025-01-11

## 2025-01-11 PROBLEM — N95.0 POST-MENOPAUSAL BLEEDING: Status: RESOLVED | Noted: 2024-04-02 | Resolved: 2025-01-11

## 2025-01-11 PROBLEM — N93.9 ABNORMAL UTERINE BLEEDING: Status: RESOLVED | Noted: 2019-05-08 | Resolved: 2025-01-11

## 2025-01-11 PROBLEM — M79.631 RIGHT FOREARM PAIN: Status: RESOLVED | Noted: 2024-02-16 | Resolved: 2025-01-11

## 2025-01-11 PROBLEM — M62.838 CERVICAL PARASPINAL MUSCLE SPASM: Status: RESOLVED | Noted: 2024-04-02 | Resolved: 2025-01-11

## 2025-01-11 PROBLEM — M54.2 ACUTE NECK PAIN: Status: RESOLVED | Noted: 2024-02-16 | Resolved: 2025-01-11

## 2025-01-11 LAB — S PYO DNA THROAT QL NAA+PROBE: NOT DETECTED

## 2025-01-11 PROCEDURE — 99213 OFFICE O/P EST LOW 20 MIN: CPT | Performed by: PHYSICIAN ASSISTANT

## 2025-01-11 RX ORDER — VITAMIN B COMPLEX
1 CAPSULE ORAL DAILY
COMMUNITY

## 2025-01-11 NOTE — PATIENT INSTRUCTIONS
[facility] - [facility],  [address],  [place] [address]

## 2025-01-11 NOTE — PROGRESS NOTES
Virtual Regular Visit  Name: Kerline Garcia      : 1965      MRN: [id number]  Encounter Provider: Shannon D Severino, PA-C  Encounter Date: 2025   Encounter department: VIRTUAL CARE       Verification of patient location:  Patient is located at Home in the following state in which I hold an active license [place] :  Assessment & Plan  Pharyngitis, unspecified etiology    Orders:    Strep A PCR; Future        Encounter provider Shannon D Severino, PA-C    The patient was identified by name and date of birth. Kerline Garcia was informed that this is a telemedicine visit and that the visit is being conducted through the Epic Embedded platform. She agrees to proceed..  My office door was closed. No one else was in the room.  She acknowledged consent and understanding of privacy and security of the video platform. The patient has agreed to participate and understands they can discontinue the visit at any time.    Patient is aware this is a billable service.     History was obtained from: History obtained from: patient  History of Present Illness     Pt reports had a cough and congestion starting . Sx started to go away, but then 3-4 days ago started with pain in throat. Painful to swallow, has swollen and tender lymph nodes. Took robitussin, cough drops. No fevers except maybe low-grade, had some chills a few days ago.       Review of Systems   Constitutional:  Negative for fever.   HENT:  Negative for nosebleeds.    Eyes:  Negative for redness.   Respiratory:  Negative for shortness of breath.    Cardiovascular:  Negative for chest pain.   Gastrointestinal:  Negative for blood in stool.   Genitourinary:  Negative for hematuria.   Musculoskeletal:  Negative for gait problem.   Skin:  Negative for rash.   Neurological:  Negative for seizures.   Psychiatric/Behavioral:  Negative for behavioral problems.        Objective   Temp (!) 97 °F (36.1 °C)     Physical Exam  Constitutional:       General: She is not in  acute distress.     Appearance: Normal appearance. She is well-developed, well-groomed and normal weight. She is not toxic-appearing.   HENT:      Head: Normocephalic and atraumatic.      Nose: No rhinorrhea.      Mouth/Throat:      Mouth: Mucous membranes are moist.      Pharynx: Uvula midline. Posterior oropharyngeal erythema and postnasal drip (cobblestoning, erythema) present. No uvula swelling.      Comments: Tonsils surgically absent.   Eyes:      Conjunctiva/sclera: Conjunctivae normal.   Pulmonary:      Effort: Pulmonary effort is normal. No respiratory distress.      Breath sounds: No wheezing (no gross audible wheeze through computer).   Musculoskeletal:      Cervical back: Normal range of motion.   Skin:     Findings: No rash (on face or neck).   Neurological:      Mental Status: She is alert.      Cranial Nerves: No dysarthria or facial asymmetry.   Psychiatric:         Mood and Affect: Mood normal.         Behavior: Behavior normal.         Visit Time  Total Visit Duration: 9 minutes not including the time spent for establishing the audio/video connection.

## 2025-01-13 ENCOUNTER — HOSPITAL ENCOUNTER (OUTPATIENT)
Dept: HOSPITAL 99 - WDC | Age: 60
End: 2025-01-13
Payer: COMMERCIAL

## 2025-01-13 DIAGNOSIS — Z12.31: Primary | ICD-10-CM

## 2025-03-21 DIAGNOSIS — Z12.11 SCREENING FOR COLON CANCER: Primary | ICD-10-CM

## 2025-04-09 LAB
T4 FREE SERPL-MCNC: 1.26 NG/DL (ref 0.82–1.77)
TSH SERPL DL<=0.005 MIU/L-ACNC: 2.53 UIU/ML (ref 0.45–4.5)

## 2025-07-16 ENCOUNTER — OFFICE VISIT (OUTPATIENT)
Dept: FAMILY MEDICINE CLINIC | Facility: CLINIC | Age: 60
End: 2025-07-16
Payer: COMMERCIAL

## 2025-07-16 VITALS
HEART RATE: 72 BPM | SYSTOLIC BLOOD PRESSURE: 138 MMHG | HEIGHT: 66 IN | WEIGHT: 148 LBS | TEMPERATURE: 96.2 F | DIASTOLIC BLOOD PRESSURE: 98 MMHG | OXYGEN SATURATION: 100 % | BODY MASS INDEX: 23.78 KG/M2

## 2025-07-16 DIAGNOSIS — R73.01 IMPAIRED FASTING GLUCOSE: ICD-10-CM

## 2025-07-16 DIAGNOSIS — E05.00 GRAVES DISEASE: ICD-10-CM

## 2025-07-16 DIAGNOSIS — Z13.1 SCREENING FOR DIABETES MELLITUS: ICD-10-CM

## 2025-07-16 DIAGNOSIS — Z13.0 SCREENING FOR DEFICIENCY ANEMIA: ICD-10-CM

## 2025-07-16 DIAGNOSIS — E78.2 MIXED HYPERLIPIDEMIA: ICD-10-CM

## 2025-07-16 DIAGNOSIS — Z00.00 ANNUAL PHYSICAL EXAM: Primary | ICD-10-CM

## 2025-07-16 DIAGNOSIS — R10.11 RUQ PAIN: ICD-10-CM

## 2025-07-16 PROCEDURE — 99396 PREV VISIT EST AGE 40-64: CPT | Performed by: NURSE PRACTITIONER

## 2025-07-16 NOTE — PATIENT INSTRUCTIONS
Recommend shingles vaccine- call insurance to check benefits if can get in doctors office or pharmacy  Schedule colonoscopy    Check fasting labs  Schedule US of right upper quadrant

## 2025-07-16 NOTE — PROGRESS NOTES
Adult Annual Physical  Name: Kerline Garcia      : 1965      MRN: [id number]  Encounter Provider: SUDHAKAR Adams  Encounter Date: 2025   Encounter department: [facility]    :  Assessment & Plan  Annual physical exam         Screening for diabetes mellitus    Orders:    Comprehensive metabolic panel; Future    Screening for deficiency anemia    Orders:    CBC and differential; Future    Mixed hyperlipidemia  Elevated , due for repeat  Orders:    Lipid panel; Future    Impaired fasting glucose    Orders:    Hemoglobin A1c (w/out EAG); Future    RUQ pain  Check US of RUQ  Also checking CMP to check liver enzymes  Orders:    US right upper quadrant; Future    Graves disease  Continues with endocrinology  They are managing methimazole currently taking 2.5mg M, W, F             Preventive Screenings:  - Diabetes Screening: risks/benefits discussed and orders placed  - Cholesterol Screening: screening not indicated, risks/benefits discussed, orders placed and has hyperlipidemia   - Hepatitis C screening: screening up-to-date   - Cervical cancer screening: risks/benefits discussed   - Breast cancer screening: screening up-to-date   - Colon cancer screening: risks/benefits discussed and orders placed   - Lung cancer screening: screening not indicated     Immunizations:  - Immunizations due: Zoster (Shingrix)    Counseling/Anticipatory Guidance:  - Alcohol: discussed moderation in alcohol intake and recommendations for healthy alcohol use.   - Dental health: discussed importance of regular tooth brushing, flossing, and dental visits.   - Sexual health: discussed sexually transmitted diseases, partner selection, use of condoms, avoidance of unintended pregnancy, and contraceptive alternatives.   - Diet: discussed recommendations for a healthy/well-balanced diet.   - Exercise: the importance of regular exercise/physical activity was discussed. Recommend exercise 3-5 times per week  for at least 30 minutes.   - Injury prevention: discussed safety/seat belts, safety helmets, smoke detectors, carbon monoxide detectors, and smoking near bedding or upholstery.       Depression Screening and Follow-up Plan: Patient was screened for depression during today's encounter. They screened negative with a PHQ-2 score of 0.          History of Present Illness     Adult Annual Physical:  Patient presents for annual physical. Here today     Bps at home have been normal.     She is following with endocrinology for her thyroid she is due for labs in August has orders for thyroid- she is on methimazole   She is scheduled to see rheumatology in September for first - had xrays of right hand showed tissue swelling but didn't show bony abnormalities. Then started in left hand.    She does have periods of intermittent zaps of RUQ pain, family history of gallsbladder disease in both parents  Has order for DEXA scan  Stopped her menses in 2022    Has appointment with GYN this weekend.     Diet and Physical Activity:  - Diet/Nutrition: well balanced diet, portion control, heart healthy (low sodium) diet, low carb diet, consuming 3-5 servings of fruits/vegetables daily and adequate fiber intake.  - Exercise: walking, moderate cardiovascular exercise, strength training exercises and 1-2 times a week on average.    Depression Screening:  - PHQ-2 Score: 0    General Health:  - Sleep: sleeps well, sleeps poorly and 4-6 hours of sleep on average.  - Hearing: normal hearing right ear, normal hearing left ear and tinnitus.  - Vision: most recent eye exam < 1 year ago and wears glasses.  - Dental: regular dental visits, brushes teeth twice daily and floss regularly.    /GYN Health:  - Follows with GYN: yes.   - Menopause: postmenopausal.   - Last menstrual cycle: 9/11/2022.   - History of STDs: no  - Contraception: menopause.      Advanced Care Planning:  - Has an advanced directive?: yes    - Has a durable medical POA?: yes   "    Review of Systems   Constitutional:  Negative for chills and fever.   HENT:  Negative for ear pain and sore throat.    Eyes:  Negative for pain and visual disturbance.   Respiratory:  Negative for cough and shortness of breath.    Cardiovascular:  Negative for chest pain and palpitations.   Gastrointestinal:  Negative for abdominal pain and vomiting.   Genitourinary:  Negative for dysuria and hematuria.   Musculoskeletal:  Negative for arthralgias and back pain.        Soft tissue swelling in hands at times     Skin:  Negative for color change and rash.   Neurological:  Negative for dizziness, seizures, syncope and light-headedness.   Psychiatric/Behavioral:  Positive for sleep disturbance.    All other systems reviewed and are negative.        Objective   /98 (BP Location: Left arm, Patient Position: Sitting, Cuff Size: Adult)   Pulse 72   Temp (!) 96.2 °F (35.7 °C) (Tympanic)   Ht 5' 5.5\" (1.664 m)   Wt 67.1 kg (148 lb)   LMP 09/11/2022   SpO2 100%   BMI 24.25 kg/m²     Physical Exam  Vitals and nursing note reviewed.   Constitutional:       General: She is not in acute distress.     Appearance: She is well-developed and normal weight.   HENT:      Head: Normocephalic and atraumatic.      Right Ear: Tympanic membrane, ear canal and external ear normal.      Left Ear: Tympanic membrane, ear canal and external ear normal.      Nose: Nose normal.      Mouth/Throat:      Mouth: Mucous membranes are moist.      Pharynx: Oropharynx is clear.     Eyes:      Conjunctiva/sclera: Conjunctivae normal.      Pupils: Pupils are equal, round, and reactive to light.     Neck:      Thyroid: No thyromegaly or thyroid tenderness.     Cardiovascular:      Rate and Rhythm: Normal rate and regular rhythm.      Pulses:           Radial pulses are 2+ on the right side and 2+ on the left side.      Heart sounds: Normal heart sounds. No murmur heard.  Pulmonary:      Effort: Pulmonary effort is normal. No respiratory " distress.      Breath sounds: Normal breath sounds.   Abdominal:      General: Bowel sounds are normal.      Palpations: Abdomen is soft.      Tenderness: There is no abdominal tenderness.     Musculoskeletal:      Cervical back: Neck supple.      Right lower leg: No edema.      Left lower leg: No edema.   Lymphadenopathy:      Cervical: No cervical adenopathy.     Skin:     General: Skin is warm and dry.      Capillary Refill: Capillary refill takes less than 2 seconds.     Neurological:      Mental Status: She is alert and oriented to person, place, and time.     Psychiatric:         Mood and Affect: Mood normal.         Behavior: Behavior normal.         Thought Content: Thought content normal.

## 2025-08-18 LAB
ALBUMIN SERPL-MCNC: 4.7 G/DL (ref 3.8–4.9)
ALP SERPL-CCNC: 76 IU/L (ref 44–121)
ALT SERPL-CCNC: 15 IU/L (ref 0–32)
AST SERPL-CCNC: 21 IU/L (ref 0–40)
BASOPHILS # BLD AUTO: 0.1 X10E3/UL (ref 0–0.2)
BASOPHILS NFR BLD AUTO: 1 %
BILIRUB SERPL-MCNC: 0.7 MG/DL (ref 0–1.2)
BUN SERPL-MCNC: 15 MG/DL (ref 6–24)
BUN/CREAT SERPL: 16 (ref 9–23)
CALCIUM SERPL-MCNC: 9.6 MG/DL (ref 8.7–10.2)
CHLORIDE SERPL-SCNC: 100 MMOL/L (ref 96–106)
CHOLEST SERPL-MCNC: 208 MG/DL (ref 100–199)
CHOLEST/HDLC SERPL: 3.4 RATIO (ref 0–4.4)
CO2 SERPL-SCNC: 22 MMOL/L (ref 20–29)
CREAT SERPL-MCNC: 0.92 MG/DL (ref 0.57–1)
EGFR: 72 ML/MIN/1.73
EOSINOPHIL # BLD AUTO: 0.1 X10E3/UL (ref 0–0.4)
EOSINOPHIL NFR BLD AUTO: 3 %
ERYTHROCYTE [DISTWIDTH] IN BLOOD BY AUTOMATED COUNT: 11.7 % (ref 11.7–15.4)
GLOBULIN SER-MCNC: 2.4 G/DL (ref 1.5–4.5)
GLUCOSE SERPL-MCNC: 96 MG/DL (ref 70–99)
HBA1C MFR BLD: 5.4 % (ref 4.8–5.6)
HCT VFR BLD AUTO: 41.3 % (ref 34–46.6)
HDLC SERPL-MCNC: 61 MG/DL
HGB BLD-MCNC: 13.7 G/DL (ref 11.1–15.9)
IMM GRANULOCYTES # BLD: 0 X10E3/UL (ref 0–0.1)
IMM GRANULOCYTES NFR BLD: 0 %
LDLC SERPL CALC-MCNC: 138 MG/DL (ref 0–99)
LYMPHOCYTES # BLD AUTO: 1.5 X10E3/UL (ref 0.7–3.1)
LYMPHOCYTES NFR BLD AUTO: 33 %
MCH RBC QN AUTO: 31.3 PG (ref 26.6–33)
MCHC RBC AUTO-ENTMCNC: 33.2 G/DL (ref 31.5–35.7)
MCV RBC AUTO: 94 FL (ref 79–97)
MONOCYTES # BLD AUTO: 0.4 X10E3/UL (ref 0.1–0.9)
MONOCYTES NFR BLD AUTO: 8 %
NEUTROPHILS # BLD AUTO: 2.4 X10E3/UL (ref 1.4–7)
NEUTROPHILS NFR BLD AUTO: 55 %
PLATELET # BLD AUTO: 248 X10E3/UL (ref 150–450)
POTASSIUM SERPL-SCNC: 4.3 MMOL/L (ref 3.5–5.2)
PROT SERPL-MCNC: 7.1 G/DL (ref 6–8.5)
RBC # BLD AUTO: 4.38 X10E6/UL (ref 3.77–5.28)
SL AMB VLDL CHOLESTEROL CALC: 9 MG/DL (ref 5–40)
SODIUM SERPL-SCNC: 139 MMOL/L (ref 134–144)
TRIGL SERPL-MCNC: 48 MG/DL (ref 0–149)
WBC # BLD AUTO: 4.5 X10E3/UL (ref 3.4–10.8)

## (undated) RX ORDER — BRIMONIDINE TARTRATE 0.25 MG/ML: 1 SOLUTION/ DROPS OPHTHALMIC AS NEEDED